# Patient Record
Sex: FEMALE | Race: WHITE | NOT HISPANIC OR LATINO | Employment: OTHER | URBAN - METROPOLITAN AREA
[De-identification: names, ages, dates, MRNs, and addresses within clinical notes are randomized per-mention and may not be internally consistent; named-entity substitution may affect disease eponyms.]

---

## 2023-01-24 PROBLEM — M54.50 CHRONIC LOW BACK PAIN: Status: ACTIVE | Noted: 2023-01-24

## 2023-01-24 PROBLEM — G89.29 CHRONIC LOW BACK PAIN: Status: ACTIVE | Noted: 2023-01-24

## 2023-01-24 PROBLEM — M62.81 PROXIMAL MUSCLE WEAKNESS: Status: ACTIVE | Noted: 2023-01-24

## 2023-01-24 PROBLEM — R53.81 CHRONIC FATIGUE AND MALAISE: Status: ACTIVE | Noted: 2023-01-24

## 2023-01-24 PROBLEM — M25.512 LEFT SHOULDER PAIN: Status: ACTIVE | Noted: 2023-01-24

## 2023-01-24 PROBLEM — R40.0 DAYTIME SLEEPINESS: Status: ACTIVE | Noted: 2023-01-24

## 2023-01-24 PROBLEM — R60.0 PERIPHERAL EDEMA: Status: ACTIVE | Noted: 2023-01-24

## 2023-01-24 PROBLEM — E78.5 DYSLIPIDEMIA: Status: ACTIVE | Noted: 2023-01-24

## 2023-01-24 PROBLEM — R51.9 CHRONIC DAILY HEADACHE: Status: ACTIVE | Noted: 2023-01-24

## 2023-01-24 PROBLEM — M79.7 FIBROMYALGIA: Status: ACTIVE | Noted: 2023-01-24

## 2023-01-24 PROBLEM — M19.90 ARTHRITIS: Status: ACTIVE | Noted: 2023-01-24

## 2023-01-24 PROBLEM — M54.12 CERVICAL RADICULITIS: Status: ACTIVE | Noted: 2023-01-24

## 2023-01-24 PROBLEM — K22.2 SCHATZKI'S RING: Status: ACTIVE | Noted: 2023-01-24

## 2023-01-24 PROBLEM — R53.82 CHRONIC FATIGUE AND MALAISE: Status: ACTIVE | Noted: 2023-01-24

## 2023-01-24 PROBLEM — R60.9 PERIPHERAL EDEMA: Status: ACTIVE | Noted: 2023-01-24

## 2023-01-24 PROBLEM — E55.9 VITAMIN D DEFICIENCY: Status: ACTIVE | Noted: 2023-01-24

## 2023-01-24 PROBLEM — M53.86 SCIATICA OF RIGHT SIDE ASSOCIATED WITH DISORDER OF LUMBAR SPINE: Status: ACTIVE | Noted: 2023-01-24

## 2023-01-24 PROBLEM — I10 HTN (HYPERTENSION): Status: ACTIVE | Noted: 2023-01-24

## 2023-01-24 PROBLEM — G25.81 RESTLESS LEG: Status: ACTIVE | Noted: 2023-01-24

## 2023-01-24 RX ORDER — ROSUVASTATIN CALCIUM 10 MG/1
10 TABLET, COATED ORAL DAILY
COMMUNITY
Start: 2022-08-18 | End: 2023-03-31 | Stop reason: SDUPTHER

## 2023-01-24 RX ORDER — LISINOPRIL 5 MG/1
5 TABLET ORAL DAILY
COMMUNITY
Start: 2021-12-29 | End: 2023-03-31 | Stop reason: SDUPTHER

## 2023-03-31 ENCOUNTER — OFFICE VISIT (OUTPATIENT)
Dept: PRIMARY CARE | Facility: CLINIC | Age: 63
End: 2023-03-31
Payer: MEDICARE

## 2023-03-31 VITALS
HEIGHT: 67 IN | HEART RATE: 70 BPM | BODY MASS INDEX: 27.2 KG/M2 | DIASTOLIC BLOOD PRESSURE: 78 MMHG | SYSTOLIC BLOOD PRESSURE: 130 MMHG | RESPIRATION RATE: 16 BRPM | WEIGHT: 173.28 LBS

## 2023-03-31 DIAGNOSIS — I20.89 ATYPICAL ANGINA (CMS-HCC): ICD-10-CM

## 2023-03-31 DIAGNOSIS — I73.9 PERIPHERAL VASCULAR DISEASE, ASYMPTOMATIC (CMS-HCC): Primary | ICD-10-CM

## 2023-03-31 DIAGNOSIS — I10 PRIMARY HYPERTENSION: ICD-10-CM

## 2023-03-31 DIAGNOSIS — E78.5 DYSLIPIDEMIA: ICD-10-CM

## 2023-03-31 DIAGNOSIS — M19.90 ARTHRITIS: ICD-10-CM

## 2023-03-31 PROCEDURE — 3078F DIAST BP <80 MM HG: CPT | Performed by: FAMILY MEDICINE

## 2023-03-31 PROCEDURE — 99214 OFFICE O/P EST MOD 30 MIN: CPT | Performed by: FAMILY MEDICINE

## 2023-03-31 PROCEDURE — 3075F SYST BP GE 130 - 139MM HG: CPT | Performed by: FAMILY MEDICINE

## 2023-03-31 RX ORDER — ROSUVASTATIN CALCIUM 10 MG/1
10 TABLET, COATED ORAL DAILY
Qty: 90 TABLET | Refills: 3 | Status: SHIPPED | OUTPATIENT
Start: 2023-03-31 | End: 2024-03-28 | Stop reason: SDUPTHER

## 2023-03-31 RX ORDER — LISINOPRIL 5 MG/1
5 TABLET ORAL DAILY
Qty: 90 TABLET | Refills: 3 | Status: SHIPPED | OUTPATIENT
Start: 2023-03-31 | End: 2024-03-28 | Stop reason: SDUPTHER

## 2023-03-31 NOTE — PATIENT INSTRUCTIONS
Please continue follow up with your bariatric team.     Mammogram in Aug 2022 was good. repeat yearly.     Blood pressure looks still a little bit elevated, goal is to keep it under 130/78.     Weight is down another 23 #,      Labs due again in November 2023    Refilled the crestor and lisinopril     monitor your BP at home.     Follow up in 6 months for a yearly Medicare Annual Wellness Examination r sooner as needed.

## 2023-03-31 NOTE — PROGRESS NOTES
Subjective   Dara Abdullahi is a 62 y.o. female who presents for 4 month follow up .    HPI  Dara is a 61 yr old female here for follow up   On SS/disability since last visit   Pt had a hysterectomy, no need for PAP    #) obesity with comorbidities - had Bariatric surgery in Aug 2022.   - BMI 36--> 31  - START WEIGHT: 255  - CURRENT WEIGHT: 235--> 199--> 173   - 8/4/22 had bariatric surgery   - tolerable discomfort   - current diet is liquid to advance to pureed today  - reduced appetite   - no nausea, vomiting  - normal BM     #) Mood is good and sleep is not good   - not comfortable   - frequent wakening     #Leg swelling - not too bad - continues to get better with weight loss  - legs always hurt , this is chronic  -comes and goes   - will get ECHO and trial lasix with Potassium     #) intermittent HA- stable   - slight improvement with topamax 100- thinks it helped some with the increased dose   - most days  - occasional neck pain, bilateral   - no emesis  - trying not to take any more NSAIDS    #) Esophageal strictures, GERD and abdominal pain  prior to surgery - stable , stable no problems with swallowing, once after eating get nauseated     #) lack of motivation without depression- stable , improved   - denies depression   - + snoring, continued to decline a sleep study- screen with bariatric surgery and was mild prior to weight loss    #) HLD  - tried on atorvastatin 20 mg QHS  - causes myalgias so stopped taking it     #) Joint and muscle pain; dx of fibromyalgia  - now back is really hurting her, does have arthritis with CT  - hurting all the time, requesting SS /disability; already on disability, applying for SS  - FCT done 11/2014  - order PT last visit   - pain mostly in the legs, arm, feet and back  - Tylenol for pain with no relief, cant take NSAIDs because of the stomach   - low back x-rays showed DJD, recommend PT   - could not tolerate gabapentin, lyrica, Effexor and cymbalta   - saw neuro  "10/18/14, EMG, skin biopsy, US for PAD, autoimmune panel all negative    #) low vitamin D  - on replacement and recheck with new blood work    #) Preventive:  Smoker: no  Etoh: no  BP: 126/78, 117/70, 132/81, 122/74  Fam h/o: mother with CAD, DM and CA; father with Etoh use   Vaccinations:  PAP: 2010, vaginal hysterectomy for uterine prolapse  Mammogram: 3/21/17 normal ; 4/23/18, 4/23/19  Colonoscopy: needs, recommended f/u with Dr. Friedman 4/28/17 appt  DEXA: n/a  HepC screen: NR on 3/9/17  Fasting blood work: 3/9/17, 4/23/18  Last eye exam: needs  Last dental Exam: needs  Retired   4 children       ROS was completed and all systems are negative with the exception of what was noted in the the HPI.     Objective     /78   Pulse 70   Resp 16   Ht 1.702 m (5' 7\")   Wt 78.6 kg (173 lb 4.5 oz)   BMI 27.14 kg/m²      Physical Exam  Gen: A+O, NAD  HEENT: NC/AT, EOMI, no LAD, oropharynx clear, no edema or erythema, TM visualized and normal  CV: RRR, No M/R/G  Resp: CTAB, No W/R/C  Abd: Soft, NT/ND  Neuro: PERRL, moves all extremities equally, normal gait   Skin: No rashes, no LE edema or varicosities   MSK: Grossly intact strength and reflexes; normal gait  Psych: Normal mood and affect    Assessment/Plan   Problem List Items Addressed This Visit          Circulatory    HTN (hypertension)    Peripheral vascular disease, asymptomatic (CMS/HCC) - Primary    Atypical angina (CMS/HCC)       Musculoskeletal    Arthritis       Other    Dyslipidemia   Please continue follow up with your bariatric team.     Mammogram in Aug 2022 was good. repeat yearly.     Blood pressure looks still a little bit elevated, goal is to keep it under 130/78.     Weight is down another 23 #,      Labs due again in November 2023    Refilled the crestor and lisinopril     monitor your BP at home.     Follow up in 6 months for a yearly Medicare Annual Wellness Examination r sooner as needed.             Dara Carpenter, DO, MSMed, ABOM  [address]   [address]   [place] [address]  Ph. [phone]  Fx. [phone]

## 2023-04-05 LAB
ALANINE AMINOTRANSFERASE (SGPT) (U/L) IN SER/PLAS: 21 U/L (ref 7–45)
ALBUMIN (G/DL) IN SER/PLAS: 3.8 G/DL (ref 3.4–5)
ALKALINE PHOSPHATASE (U/L) IN SER/PLAS: 58 U/L (ref 33–136)
ANION GAP IN SER/PLAS: 11 MMOL/L (ref 10–20)
ASPARTATE AMINOTRANSFERASE (SGOT) (U/L) IN SER/PLAS: 18 U/L (ref 9–39)
BASOPHILS (10*3/UL) IN BLOOD BY AUTOMATED COUNT: 0.03 X10E9/L (ref 0–0.1)
BASOPHILS/100 LEUKOCYTES IN BLOOD BY AUTOMATED COUNT: 0.5 % (ref 0–2)
BILIRUBIN TOTAL (MG/DL) IN SER/PLAS: 0.7 MG/DL (ref 0–1.2)
CALCIUM (MG/DL) IN SER/PLAS: 8.9 MG/DL (ref 8.6–10.3)
CARBON DIOXIDE, TOTAL (MMOL/L) IN SER/PLAS: 32 MMOL/L (ref 21–32)
CHLORIDE (MMOL/L) IN SER/PLAS: 102 MMOL/L (ref 98–107)
CREATININE (MG/DL) IN SER/PLAS: 0.63 MG/DL (ref 0.5–1.05)
EOSINOPHILS (10*3/UL) IN BLOOD BY AUTOMATED COUNT: 0.06 X10E9/L (ref 0–0.7)
EOSINOPHILS/100 LEUKOCYTES IN BLOOD BY AUTOMATED COUNT: 1 % (ref 0–6)
ERYTHROCYTE DISTRIBUTION WIDTH (RATIO) BY AUTOMATED COUNT: 13.5 % (ref 11.5–14.5)
ERYTHROCYTE MEAN CORPUSCULAR HEMOGLOBIN CONCENTRATION (G/DL) BY AUTOMATED: 33.3 G/DL (ref 32–36)
ERYTHROCYTE MEAN CORPUSCULAR VOLUME (FL) BY AUTOMATED COUNT: 95 FL (ref 80–100)
ERYTHROCYTES (10*6/UL) IN BLOOD BY AUTOMATED COUNT: 3.71 X10E12/L (ref 4–5.2)
ESTIMATED AVERAGE GLUCOSE FOR HBA1C: 103 MG/DL
FERRITIN (UG/LL) IN SER/PLAS: 141 UG/L (ref 8–150)
GFR FEMALE: >90 ML/MIN/1.73M2
GLUCOSE (MG/DL) IN SER/PLAS: 80 MG/DL (ref 74–99)
HEMATOCRIT (%) IN BLOOD BY AUTOMATED COUNT: 35.1 % (ref 36–46)
HEMOGLOBIN (G/DL) IN BLOOD: 11.7 G/DL (ref 12–16)
HEMOGLOBIN A1C/HEMOGLOBIN TOTAL IN BLOOD: 5.2 %
IMMATURE GRANULOCYTES/100 LEUKOCYTES IN BLOOD BY AUTOMATED COUNT: 0.2 % (ref 0–0.9)
LEUKOCYTES (10*3/UL) IN BLOOD BY AUTOMATED COUNT: 5.7 X10E9/L (ref 4.4–11.3)
LYMPHOCYTES (10*3/UL) IN BLOOD BY AUTOMATED COUNT: 2.02 X10E9/L (ref 1.2–4.8)
LYMPHOCYTES/100 LEUKOCYTES IN BLOOD BY AUTOMATED COUNT: 35.3 % (ref 13–44)
MONOCYTES (10*3/UL) IN BLOOD BY AUTOMATED COUNT: 0.36 X10E9/L (ref 0.1–1)
MONOCYTES/100 LEUKOCYTES IN BLOOD BY AUTOMATED COUNT: 6.3 % (ref 2–10)
NEUTROPHILS (10*3/UL) IN BLOOD BY AUTOMATED COUNT: 3.24 X10E9/L (ref 1.2–7.7)
NEUTROPHILS/100 LEUKOCYTES IN BLOOD BY AUTOMATED COUNT: 56.7 % (ref 40–80)
PLATELETS (10*3/UL) IN BLOOD AUTOMATED COUNT: 237 X10E9/L (ref 150–450)
POTASSIUM (MMOL/L) IN SER/PLAS: 2.5 MMOL/L (ref 3.5–5.3)
PROTEIN TOTAL: 7.6 G/DL (ref 6.4–8.2)
SODIUM (MMOL/L) IN SER/PLAS: 142 MMOL/L (ref 136–145)
UREA NITROGEN (MG/DL) IN SER/PLAS: 6 MG/DL (ref 6–23)

## 2023-04-06 LAB
CALCIDIOL (25 OH VITAMIN D3) (NG/ML) IN SER/PLAS: 39 NG/ML
COBALAMIN (VITAMIN B12) (PG/ML) IN SER/PLAS: 501 PG/ML (ref 211–911)
FOLATE (NG/ML) IN SER/PLAS: 22.8 NG/ML

## 2023-04-10 LAB — VITAMIN B1, WHOLE BLOOD: 121 NMOL/L (ref 70–180)

## 2023-05-26 ENCOUNTER — HOSPITAL ENCOUNTER (OUTPATIENT)
Dept: DATA CONVERSION | Facility: HOSPITAL | Age: 63
End: 2023-05-26
Attending: SURGERY | Admitting: SURGERY
Payer: COMMERCIAL

## 2023-05-26 DIAGNOSIS — K21.9 GASTRO-ESOPHAGEAL REFLUX DISEASE WITHOUT ESOPHAGITIS: ICD-10-CM

## 2023-05-26 DIAGNOSIS — Z98.84 BARIATRIC SURGERY STATUS: ICD-10-CM

## 2023-05-26 DIAGNOSIS — R11.0 NAUSEA: ICD-10-CM

## 2023-05-26 DIAGNOSIS — I10 ESSENTIAL (PRIMARY) HYPERTENSION: ICD-10-CM

## 2023-05-26 DIAGNOSIS — K44.9 DIAPHRAGMATIC HERNIA WITHOUT OBSTRUCTION OR GANGRENE: ICD-10-CM

## 2023-05-26 DIAGNOSIS — E78.5 HYPERLIPIDEMIA, UNSPECIFIED: ICD-10-CM

## 2023-05-26 DIAGNOSIS — D64.9 ANEMIA, UNSPECIFIED: ICD-10-CM

## 2023-05-26 DIAGNOSIS — M79.7 FIBROMYALGIA: ICD-10-CM

## 2023-05-26 DIAGNOSIS — G47.33 OBSTRUCTIVE SLEEP APNEA (ADULT) (PEDIATRIC): ICD-10-CM

## 2023-05-26 LAB
ANION GAP IN SER/PLAS: 16 MMOL/L (ref 10–20)
CALCIUM (MG/DL) IN SER/PLAS: 9 MG/DL (ref 8.6–10.3)
CARBON DIOXIDE, TOTAL (MMOL/L) IN SER/PLAS: 27 MMOL/L (ref 21–32)
CHLORIDE (MMOL/L) IN SER/PLAS: 104 MMOL/L (ref 98–107)
CREATININE (MG/DL) IN SER/PLAS: 0.58 MG/DL (ref 0.5–1.05)
GFR FEMALE: >90 ML/MIN/1.73M2
GLUCOSE (MG/DL) IN SER/PLAS: 77 MG/DL (ref 74–99)
MAGNESIUM (MG/DL) IN SER/PLAS: 1.97 MG/DL (ref 1.6–2.4)
POTASSIUM (MMOL/L) IN SER/PLAS: 3.2 MMOL/L (ref 3.5–5.3)
SODIUM (MMOL/L) IN SER/PLAS: 144 MMOL/L (ref 136–145)
UREA NITROGEN (MG/DL) IN SER/PLAS: 8 MG/DL (ref 6–23)

## 2023-06-15 LAB
CREATININE (MG/DL) IN SER/PLAS: 0.72 MG/DL (ref 0.5–1.05)
GFR FEMALE: >90 ML/MIN/1.73M2
UREA NITROGEN (MG/DL) IN SER/PLAS: 11 MG/DL (ref 6–23)

## 2023-09-07 VITALS — BODY MASS INDEX: 25.26 KG/M2 | HEIGHT: 67 IN | WEIGHT: 160.94 LBS

## 2023-09-26 ENCOUNTER — OFFICE VISIT (OUTPATIENT)
Dept: PRIMARY CARE | Facility: CLINIC | Age: 63
End: 2023-09-26
Payer: MEDICARE

## 2023-09-26 VITALS
SYSTOLIC BLOOD PRESSURE: 124 MMHG | HEART RATE: 54 BPM | OXYGEN SATURATION: 97 % | WEIGHT: 148 LBS | BODY MASS INDEX: 23.2 KG/M2 | DIASTOLIC BLOOD PRESSURE: 70 MMHG

## 2023-09-26 DIAGNOSIS — Z00.00 ROUTINE GENERAL MEDICAL EXAMINATION AT HEALTH CARE FACILITY: Primary | ICD-10-CM

## 2023-09-26 DIAGNOSIS — Z12.31 ENCOUNTER FOR SCREENING MAMMOGRAM FOR BREAST CANCER: ICD-10-CM

## 2023-09-26 DIAGNOSIS — E66.01 MORBID (SEVERE) OBESITY DUE TO EXCESS CALORIES (MULTI): ICD-10-CM

## 2023-09-26 DIAGNOSIS — Z23 NEED FOR VACCINATION: ICD-10-CM

## 2023-09-26 DIAGNOSIS — Z78.0 ASYMPTOMATIC MENOPAUSAL STATE: ICD-10-CM

## 2023-09-26 PROCEDURE — G0008 ADMIN INFLUENZA VIRUS VAC: HCPCS | Performed by: FAMILY MEDICINE

## 2023-09-26 PROCEDURE — 90686 IIV4 VACC NO PRSV 0.5 ML IM: CPT | Performed by: FAMILY MEDICINE

## 2023-09-26 PROCEDURE — 3078F DIAST BP <80 MM HG: CPT | Performed by: FAMILY MEDICINE

## 2023-09-26 PROCEDURE — 1036F TOBACCO NON-USER: CPT | Performed by: FAMILY MEDICINE

## 2023-09-26 PROCEDURE — G0439 PPPS, SUBSEQ VISIT: HCPCS | Performed by: FAMILY MEDICINE

## 2023-09-26 PROCEDURE — 3074F SYST BP LT 130 MM HG: CPT | Performed by: FAMILY MEDICINE

## 2023-09-26 ASSESSMENT — PATIENT HEALTH QUESTIONNAIRE - PHQ9
SUM OF ALL RESPONSES TO PHQ9 QUESTIONS 1 AND 2: 0
1. LITTLE INTEREST OR PLEASURE IN DOING THINGS: NOT AT ALL
2. FEELING DOWN, DEPRESSED OR HOPELESS: NOT AT ALL

## 2023-09-26 NOTE — PATIENT INSTRUCTIONS
EGD in May looked good, follow up with Dr Mitchell as recommended for the hiatal hernia repair on 10/20/23.     BP looks good today at 124/70.     Mammogram in Aug 2022 was good. repeat yearly. Due now, order is in     Labs due again in November 2023 we will get you the order after you surgery to reduce duplicating tests.     Call if you need any medication refills today     [facility] also schedule an updated bone density screen and mammogram.     We did find the colonoscopy it was down in 2017. Repeat in 2027.     Flu shot today     Follow up in 6 months for follow up or sooner as needed.

## 2023-09-26 NOTE — PROGRESS NOTES
Subjective   Reason for Visit: Dara Abdullahi is an 62 y.o. female here for a Medicare Wellness visit.      Reviewed all medications by prescribing practitioner or clinical pharmacist (such as prescriptions, OTCs, herbal therapies and supplements) and documented in the medical record.    On SS/disability since last visit   Pt had a hysterectomy, no need for PAP    HPI    #) obesity with comorbidities - had Bariatric surgery in Aug 2022.   - BMI 36--> 31--> 32  - START WEIGHT: 255  - CURRENT WEIGHT: 235--> 199--> 173 --> 148   - 8/4/22 had bariatric surgery   - tolerable discomfort   - current diet is liquid to advance to pureed today  - reduced appetite   - no nausea, vomiting  - normal BM     #) Mood is good and sleep is not good   - not comfortable   - frequent wakening     # Leg swelling - not too bad - continues to get better with weight loss  - legs always hurt , this is chronic  -comes and goes     #) intermittent HA- stable , always has one   - slight improvement with topamax 100- thinks it helped some with the increased dose   - most days  - occasional neck pain, bilateral   - no emesis  - trying not to take any more NSAIDS    #) Esophageal strictures, GERD and abdominal pain  prior to surgery - stable , stable no problems with swallowing, once after eating get nauseated   - to get an repeat hiatal hernia repeat in October 2023.     #) lack of motivation without depression- stable , improved   - denies depression   - + snoring, continued to decline a sleep study- screen with bariatric surgery and was mild prior to weight loss    #) HLD  - tried on atorvastatin 20 mg QHS  - causes myalgias so stopped taking it     #) Joint and muscle pain; dx of fibromyalgia  - stable     #) low vitamin D  - on replacement and recheck with new blood work    #) Preventive:  Smoker: no  Etoh: no  Fam h/o: mother with CAD, DM and CA; father with Etoh use   Vaccinations:  PAP: 2010, vaginal hysterectomy for uterine  prolapse  Mammogram: 3/21/17 normal ; 4/23/18, 4/23/19-- ordered   Colonoscopy: needs, recommended f/u with Dr. Friedman 4/28/17 appt  DEXA: n/a- ordered   HepC screen: NR on 3/9/17  Fasting blood work: 3/9/17, 4/23/18  Last eye exam: needs  Last dental Exam: needs  Retired   4 children     Patient Care Team:  Dara Carpenter DO as PCP - General  Dara Carpenter DO as PCP - [facility] Attributed Provider     Review of Systems  ROS was completed and all systems are negative with the exception of what was noted in the the HPI.     Objective   Vitals:  /70   Pulse 54   Wt 67.1 kg (148 lb)   SpO2 97%   BMI 23.20 kg/m²       Physical Exam  GEN: A+O, no acute distress  HEENT: NC/AT, Oropharynx clear, no exudates, TM visualized, Extraoccular muscles intact, no facial droop; no thyromegaly or cervical LAD  RESP: CTAB, no wheezes   CV: RRR, no murmurs  ABD: soft, non-tender, + BS  SKIN: no rashes or bruising, no peripheral edema   NEURO: CN II-XII grossly intact, moves all extremities equally, no tremor   PSYCH: normal affect, appropriate mood     Assessment/Plan     EGD in May looked good, follow up with Dr Mitchell as recommended for the hiatal hernia repair on 10/20/23.     BP looks good today at 124/70.     Mammogram in Aug 2022 was good. repeat yearly. Due now, order is in     Labs due again in November 2023 we will get you the order after you surgery to reduce duplicating tests.     Call if you need any medication refills today     [facility] also schedule an updated bone density screen and mammogram.     We did find the colonoscopy it was down in 2017. Repeat in 2027.     Flu shot today     Follow up in 6 months for follow up or sooner as needed.

## 2023-09-28 LAB
ALANINE AMINOTRANSFERASE (SGPT) (U/L) IN SER/PLAS: 50 U/L (ref 7–45)
ALBUMIN (G/DL) IN SER/PLAS: 4.1 G/DL (ref 3.4–5)
ALKALINE PHOSPHATASE (U/L) IN SER/PLAS: 74 U/L (ref 33–136)
ANION GAP IN SER/PLAS: 10 MMOL/L (ref 10–20)
ASPARTATE AMINOTRANSFERASE (SGOT) (U/L) IN SER/PLAS: 39 U/L (ref 9–39)
BASOPHILS (10*3/UL) IN BLOOD BY AUTOMATED COUNT: 0.03 X10E9/L (ref 0–0.1)
BASOPHILS/100 LEUKOCYTES IN BLOOD BY AUTOMATED COUNT: 0.8 % (ref 0–2)
BILIRUBIN TOTAL (MG/DL) IN SER/PLAS: 0.5 MG/DL (ref 0–1.2)
CALCIUM (MG/DL) IN SER/PLAS: 9.1 MG/DL (ref 8.6–10.3)
CARBON DIOXIDE, TOTAL (MMOL/L) IN SER/PLAS: 31 MMOL/L (ref 21–32)
CHLORIDE (MMOL/L) IN SER/PLAS: 105 MMOL/L (ref 98–107)
CREATININE (MG/DL) IN SER/PLAS: 0.69 MG/DL (ref 0.5–1.05)
EOSINOPHILS (10*3/UL) IN BLOOD BY AUTOMATED COUNT: 0.06 X10E9/L (ref 0–0.7)
EOSINOPHILS/100 LEUKOCYTES IN BLOOD BY AUTOMATED COUNT: 1.5 % (ref 0–6)
ERYTHROCYTE DISTRIBUTION WIDTH (RATIO) BY AUTOMATED COUNT: 14.1 % (ref 11.5–14.5)
ERYTHROCYTE MEAN CORPUSCULAR HEMOGLOBIN CONCENTRATION (G/DL) BY AUTOMATED: 32.4 G/DL (ref 32–36)
ERYTHROCYTE MEAN CORPUSCULAR VOLUME (FL) BY AUTOMATED COUNT: 97 FL (ref 80–100)
ERYTHROCYTES (10*6/UL) IN BLOOD BY AUTOMATED COUNT: 3.86 X10E12/L (ref 4–5.2)
GFR FEMALE: >90 ML/MIN/1.73M2
GLUCOSE (MG/DL) IN SER/PLAS: 86 MG/DL (ref 74–99)
HEMATOCRIT (%) IN BLOOD BY AUTOMATED COUNT: 37.4 % (ref 36–46)
HEMOGLOBIN (G/DL) IN BLOOD: 12.1 G/DL (ref 12–16)
IMMATURE GRANULOCYTES/100 LEUKOCYTES IN BLOOD BY AUTOMATED COUNT: 0.3 % (ref 0–0.9)
INR IN PPP BY COAGULATION ASSAY: 1 (ref 0.9–1.1)
LEUKOCYTES (10*3/UL) IN BLOOD BY AUTOMATED COUNT: 4 X10E9/L (ref 4.4–11.3)
LYMPHOCYTES (10*3/UL) IN BLOOD BY AUTOMATED COUNT: 1.16 X10E9/L (ref 1.2–4.8)
LYMPHOCYTES/100 LEUKOCYTES IN BLOOD BY AUTOMATED COUNT: 29 % (ref 13–44)
MONOCYTES (10*3/UL) IN BLOOD BY AUTOMATED COUNT: 0.27 X10E9/L (ref 0.1–1)
MONOCYTES/100 LEUKOCYTES IN BLOOD BY AUTOMATED COUNT: 6.8 % (ref 2–10)
NEUTROPHILS (10*3/UL) IN BLOOD BY AUTOMATED COUNT: 2.47 X10E9/L (ref 1.2–7.7)
NEUTROPHILS/100 LEUKOCYTES IN BLOOD BY AUTOMATED COUNT: 61.6 % (ref 40–80)
PLATELETS (10*3/UL) IN BLOOD AUTOMATED COUNT: 179 X10E9/L (ref 150–450)
POTASSIUM (MMOL/L) IN SER/PLAS: 3.3 MMOL/L (ref 3.5–5.3)
PROTEIN TOTAL: 8.1 G/DL (ref 6.4–8.2)
PROTHROMBIN TIME (PT) IN PPP BY COAGULATION ASSAY: 11.8 SEC (ref 9.8–12.8)
SODIUM (MMOL/L) IN SER/PLAS: 143 MMOL/L (ref 136–145)
UREA NITROGEN (MG/DL) IN SER/PLAS: 9 MG/DL (ref 6–23)

## 2023-09-30 NOTE — H&P
History of Present Illness:   History Present Illness:  Reason for surgery: GERD   HPI:    59 yo female with a bmi of 39 and comorbidities of gerd and high cholesterol 3 months post bypass . She is doing great and  tolerating diet.      Review of Systems:  Constitutional: No subjective fever, No lethargy  Eyes: No vision change  Head/Neck: No head pain, no neck pain  Respiratory/Thorax: No difficulty breathing, no chest pain  Cardiovascular: No chest pain, no palpitations  Gastrointestinal: No nausea, no vomiting, no abdominal pain, no diarrhea  Genitourinary: No difficulty voiding  Musculoskeletal: No muscle pain, no joint pain  Neurological: No sensory deficit, no paresthesia   Psychological: No mood change  Skin: No rash, No itching     Allergies:        Allergies:  ·  No Known Allergies :     Home Medication Review:   Home Medications Reviewed: yes     Impression/Procedure:   ·  Impression and Planned Procedure: Patient presents for EGD. All risks and benefits were explained in detail. Informed consent was obtained, and patient agrees to proceed. Preoperative imaging and work  up was reviewed.  -will proceed with planned procedure       ERAS (Enhanced Recovery After Surgery):  ·  ERAS Patient: yes   ·  CPM/PAT Utilization: yes   ·  Immunonutrition Recovery Drink Utilization: yes   ·  Carbohydrate Supplement Drink Utilization: yes       Physical Exam by System:    Constitutional: Well developed, awake/alert/oriented  x3, no distress, alert and cooperative   Eyes: PERRL, EOMI, clear sclera   ENMT: mucous membranes moist, no apparent injury,  no lesions seen   Head/Neck: Neck supple, no apparent injury, thyroid  without mass or tenderness, No JVD, trachea midline, no bruits   Respiratory/Thorax: Patent airways, CTAB, normal  breath sounds with good chest expansion, thorax symmetric   Cardiovascular: Regular, rate and rhythm, no murmurs,  2+ equal pulses of the extremities, normal S 1and S 2   Gastrointestinal:  Nondistended, soft, non-tender,  no rebound tenderness or guarding, no masses palpable, no organomegaly, +BS, no bruits   Neurological: alert and oriented x3, intact senses,  motor, response and reflexes, normal strength   Skin: Warm and dry, no lesions, no rashes     Consent:   COVID-19 Consent:  ·  COVID-19 Risk Consent Surgeon has reviewed key risks related to the risk of britton COVID-19 and if they contract COVID-19 what the risks are.     Attestation:   Note Completion:  I am a:  Resident/Fellow   Attending Attestation I saw and evaluated the patient.  I personally obtained the key and critical portions of the history and physical exam or was physically present for key and  critical portions performed by the resident/fellow. I reviewed the resident/fellow?s documentation and discussed the patient with the resident/fellow.  I agree with the resident/fellow?s medical decision making as documented in the note.   I personally evaluated the patient on 20-May-2023         Electronic Signatures:  Iman Mitchell)  (Signed 08-Jun-2023 10:40)   Authored: Note Completion   Co-Signer: History of Present Illness, Allergies, Home Medication Review, Impression/Procedure, ERAS, Physical Exam, Consent, Note Completion  Clifford Rodney)  (Signed 20-May-2023 23:24)   Authored: History of Present Illness, Allergies, Home  Medication Review, Impression/Procedure, ERAS, Physical Exam, Consent, Note Completion      Last Updated: 08-Jun-2023 10:40 by Iman Mitchell)

## 2023-10-03 LAB
ABO GROUP (TYPE) IN BLOOD: NORMAL
ANTIBODY SCREEN: NORMAL
RH FACTOR: NORMAL

## 2023-10-10 ENCOUNTER — PREP FOR PROCEDURE (OUTPATIENT)
Dept: BARIATRICS | Facility: HOSPITAL | Age: 63
End: 2023-10-10
Payer: COMMERCIAL

## 2023-10-10 DIAGNOSIS — K44.9 HIATAL HERNIA: Primary | ICD-10-CM

## 2023-10-19 ENCOUNTER — HOSPITAL ENCOUNTER (OUTPATIENT)
Dept: RADIOLOGY | Facility: HOSPITAL | Age: 63
Discharge: HOME | End: 2023-10-19
Payer: MEDICARE

## 2023-10-19 ENCOUNTER — PHARMACY VISIT (OUTPATIENT)
Dept: PHARMACY | Facility: CLINIC | Age: 63
End: 2023-10-19
Payer: COMMERCIAL

## 2023-10-19 ENCOUNTER — ANESTHESIA EVENT (OUTPATIENT)
Dept: OPERATING ROOM | Facility: HOSPITAL | Age: 63
End: 2023-10-19
Payer: MEDICARE

## 2023-10-19 DIAGNOSIS — Z12.31 ENCOUNTER FOR SCREENING MAMMOGRAM FOR BREAST CANCER: ICD-10-CM

## 2023-10-19 PROCEDURE — 77067 SCR MAMMO BI INCL CAD: CPT

## 2023-10-19 PROCEDURE — 77063 BREAST TOMOSYNTHESIS BI: CPT | Mod: BILATERAL PROCEDURE | Performed by: RADIOLOGY

## 2023-10-19 PROCEDURE — RXMED WILLOW AMBULATORY MEDICATION CHARGE

## 2023-10-19 PROCEDURE — 77067 SCR MAMMO BI INCL CAD: CPT | Mod: BILATERAL PROCEDURE | Performed by: RADIOLOGY

## 2023-10-19 PROCEDURE — 77063 BREAST TOMOSYNTHESIS BI: CPT | Mod: 50

## 2023-10-19 RX ORDER — OXYCODONE HCL 5 MG/5 ML
5 SOLUTION, ORAL ORAL EVERY 6 HOURS PRN
Qty: 140 ML | Refills: 0 | Status: SHIPPED | OUTPATIENT
Start: 2023-10-19 | End: 2024-03-28 | Stop reason: WASHOUT

## 2023-10-19 RX ORDER — ONDANSETRON 4 MG/1
4 TABLET, ORALLY DISINTEGRATING ORAL EVERY 8 HOURS PRN
Qty: 30 TABLET | Refills: 0 | Status: SHIPPED | OUTPATIENT
Start: 2023-10-19 | End: 2023-11-18

## 2023-10-19 RX ORDER — OMEPRAZOLE 40 MG/1
40 CAPSULE, DELAYED RELEASE ORAL
Qty: 30 CAPSULE | Refills: 3 | Status: SHIPPED | OUTPATIENT
Start: 2023-10-19 | End: 2024-02-16

## 2023-10-20 ENCOUNTER — ANESTHESIA (OUTPATIENT)
Dept: OPERATING ROOM | Facility: HOSPITAL | Age: 63
End: 2023-10-20
Payer: MEDICARE

## 2023-10-20 ENCOUNTER — HOSPITAL ENCOUNTER (OUTPATIENT)
Facility: HOSPITAL | Age: 63
Discharge: HOME | End: 2023-10-21
Attending: SURGERY | Admitting: SURGERY
Payer: MEDICARE

## 2023-10-20 DIAGNOSIS — K44.9 HIATAL HERNIA: Primary | ICD-10-CM

## 2023-10-20 PROBLEM — R13.10 DYSPHAGIA: Status: ACTIVE | Noted: 2023-10-20

## 2023-10-20 PROCEDURE — C1768 GRAFT, VASCULAR: HCPCS | Performed by: SURGERY

## 2023-10-20 PROCEDURE — 96372 THER/PROPH/DIAG INJ SC/IM: CPT | Performed by: STUDENT IN AN ORGANIZED HEALTH CARE EDUCATION/TRAINING PROGRAM

## 2023-10-20 PROCEDURE — 2500000005 HC RX 250 GENERAL PHARMACY W/O HCPCS: Performed by: SURGERY

## 2023-10-20 PROCEDURE — 3700000002 HC GENERAL ANESTHESIA TIME - EACH INCREMENTAL 1 MINUTE: Performed by: SURGERY

## 2023-10-20 PROCEDURE — 2500000005 HC RX 250 GENERAL PHARMACY W/O HCPCS: Performed by: REGISTERED NURSE

## 2023-10-20 PROCEDURE — 3600000004 HC OR TIME - INITIAL BASE CHARGE - PROCEDURE LEVEL FOUR: Performed by: SURGERY

## 2023-10-20 PROCEDURE — 7100000002 HC RECOVERY ROOM TIME - EACH INCREMENTAL 1 MINUTE: Performed by: SURGERY

## 2023-10-20 PROCEDURE — 2500000004 HC RX 250 GENERAL PHARMACY W/ HCPCS (ALT 636 FOR OP/ED): Performed by: ANESTHESIOLOGY

## 2023-10-20 PROCEDURE — 3600000009 HC OR TIME - EACH INCREMENTAL 1 MINUTE - PROCEDURE LEVEL FOUR: Performed by: SURGERY

## 2023-10-20 PROCEDURE — G0378 HOSPITAL OBSERVATION PER HR: HCPCS

## 2023-10-20 PROCEDURE — A43281 PR LAP, REPAIR PARAESOPHAGEAL HERNIA, INCL FUNDOPLASTY W/O MESH: Performed by: REGISTERED NURSE

## 2023-10-20 PROCEDURE — C1781 MESH (IMPLANTABLE): HCPCS | Performed by: SURGERY

## 2023-10-20 PROCEDURE — 2720000007 HC OR 272 NO HCPCS: Performed by: SURGERY

## 2023-10-20 PROCEDURE — 2500000001 HC RX 250 WO HCPCS SELF ADMINISTERED DRUGS (ALT 637 FOR MEDICARE OP): Performed by: STUDENT IN AN ORGANIZED HEALTH CARE EDUCATION/TRAINING PROGRAM

## 2023-10-20 PROCEDURE — 51702 INSERT TEMP BLADDER CATH: CPT

## 2023-10-20 PROCEDURE — 43235 EGD DIAGNOSTIC BRUSH WASH: CPT | Performed by: SURGERY

## 2023-10-20 PROCEDURE — 2780000003 HC OR 278 NO HCPCS: Performed by: SURGERY

## 2023-10-20 PROCEDURE — 2500000004 HC RX 250 GENERAL PHARMACY W/ HCPCS (ALT 636 FOR OP/ED): Performed by: REGISTERED NURSE

## 2023-10-20 PROCEDURE — 43282 LAP PARAESOPH HER RPR W/MESH: CPT | Performed by: SURGERY

## 2023-10-20 PROCEDURE — 7100000001 HC RECOVERY ROOM TIME - INITIAL BASE CHARGE: Performed by: SURGERY

## 2023-10-20 PROCEDURE — 2500000004 HC RX 250 GENERAL PHARMACY W/ HCPCS (ALT 636 FOR OP/ED): Performed by: STUDENT IN AN ORGANIZED HEALTH CARE EDUCATION/TRAINING PROGRAM

## 2023-10-20 PROCEDURE — 3700000001 HC GENERAL ANESTHESIA TIME - INITIAL BASE CHARGE: Performed by: SURGERY

## 2023-10-20 DEVICE — SHEET, FLAT, GORE BIO-A TISSUE REINFORCE, 7 X 10 CM: Type: IMPLANTABLE DEVICE | Site: ABDOMEN | Status: FUNCTIONAL

## 2023-10-20 RX ORDER — ROCURONIUM BROMIDE 10 MG/ML
INJECTION, SOLUTION INTRAVENOUS AS NEEDED
Status: DISCONTINUED | OUTPATIENT
Start: 2023-10-20 | End: 2023-10-20

## 2023-10-20 RX ORDER — OXYCODONE HCL 5 MG/5 ML
10 SOLUTION, ORAL ORAL EVERY 4 HOURS PRN
Status: DISCONTINUED | OUTPATIENT
Start: 2023-10-20 | End: 2023-10-21 | Stop reason: HOSPADM

## 2023-10-20 RX ORDER — OXYCODONE HCL 5 MG/5 ML
5 SOLUTION, ORAL ORAL EVERY 4 HOURS PRN
Status: DISCONTINUED | OUTPATIENT
Start: 2023-10-20 | End: 2023-10-21 | Stop reason: HOSPADM

## 2023-10-20 RX ORDER — METOCLOPRAMIDE HYDROCHLORIDE 5 MG/ML
10 INJECTION INTRAMUSCULAR; INTRAVENOUS EVERY 6 HOURS PRN
Status: DISCONTINUED | OUTPATIENT
Start: 2023-10-20 | End: 2023-10-21 | Stop reason: HOSPADM

## 2023-10-20 RX ORDER — MIDAZOLAM HYDROCHLORIDE 1 MG/ML
INJECTION, SOLUTION INTRAMUSCULAR; INTRAVENOUS AS NEEDED
Status: DISCONTINUED | OUTPATIENT
Start: 2023-10-20 | End: 2023-10-20

## 2023-10-20 RX ORDER — FENTANYL CITRATE 50 UG/ML
INJECTION, SOLUTION INTRAMUSCULAR; INTRAVENOUS AS NEEDED
Status: DISCONTINUED | OUTPATIENT
Start: 2023-10-20 | End: 2023-10-20

## 2023-10-20 RX ORDER — HEPARIN SODIUM 5000 [USP'U]/ML
5000 INJECTION, SOLUTION INTRAVENOUS; SUBCUTANEOUS EVERY 8 HOURS
Status: DISCONTINUED | OUTPATIENT
Start: 2023-10-20 | End: 2023-10-21 | Stop reason: HOSPADM

## 2023-10-20 RX ORDER — LIDOCAINE HCL/PF 100 MG/5ML
SYRINGE (ML) INTRAVENOUS AS NEEDED
Status: DISCONTINUED | OUTPATIENT
Start: 2023-10-20 | End: 2023-10-20

## 2023-10-20 RX ORDER — PROPOFOL 10 MG/ML
INJECTION, EMULSION INTRAVENOUS AS NEEDED
Status: DISCONTINUED | OUTPATIENT
Start: 2023-10-20 | End: 2023-10-20

## 2023-10-20 RX ORDER — ACETAMINOPHEN 325 MG/1
650 TABLET ORAL EVERY 4 HOURS PRN
Status: DISCONTINUED | OUTPATIENT
Start: 2023-10-20 | End: 2023-10-20 | Stop reason: HOSPADM

## 2023-10-20 RX ORDER — ONDANSETRON HYDROCHLORIDE 2 MG/ML
8 INJECTION, SOLUTION INTRAVENOUS ONCE
Status: DISCONTINUED | OUTPATIENT
Start: 2023-10-20 | End: 2023-10-20 | Stop reason: HOSPADM

## 2023-10-20 RX ORDER — SODIUM CHLORIDE, SODIUM LACTATE, POTASSIUM CHLORIDE, CALCIUM CHLORIDE 600; 310; 30; 20 MG/100ML; MG/100ML; MG/100ML; MG/100ML
100 INJECTION, SOLUTION INTRAVENOUS CONTINUOUS
Status: DISCONTINUED | OUTPATIENT
Start: 2023-10-20 | End: 2023-10-21 | Stop reason: HOSPADM

## 2023-10-20 RX ORDER — BUPIVACAINE HCL/EPINEPHRINE 0.5-1:200K
VIAL (ML) INJECTION AS NEEDED
Status: DISCONTINUED | OUTPATIENT
Start: 2023-10-20 | End: 2023-10-20 | Stop reason: HOSPADM

## 2023-10-20 RX ORDER — BISMUTH SUBSALICYLATE 262 MG
1 TABLET,CHEWABLE ORAL DAILY
COMMUNITY

## 2023-10-20 RX ORDER — PHENYLEPHRINE HCL IN 0.9% NACL 0.4MG/10ML
SYRINGE (ML) INTRAVENOUS AS NEEDED
Status: DISCONTINUED | OUTPATIENT
Start: 2023-10-20 | End: 2023-10-20

## 2023-10-20 RX ORDER — ALBUTEROL SULFATE 0.83 MG/ML
2.5 SOLUTION RESPIRATORY (INHALATION) ONCE AS NEEDED
Status: DISCONTINUED | OUTPATIENT
Start: 2023-10-20 | End: 2023-10-20 | Stop reason: HOSPADM

## 2023-10-20 RX ORDER — SODIUM CHLORIDE 0.9 % (FLUSH) 0.9 %
SYRINGE (ML) INJECTION AS NEEDED
Status: DISCONTINUED | OUTPATIENT
Start: 2023-10-20 | End: 2023-10-20 | Stop reason: HOSPADM

## 2023-10-20 RX ORDER — KETOROLAC TROMETHAMINE 15 MG/ML
15 INJECTION, SOLUTION INTRAMUSCULAR; INTRAVENOUS EVERY 6 HOURS
Status: DISCONTINUED | OUTPATIENT
Start: 2023-10-20 | End: 2023-10-21 | Stop reason: HOSPADM

## 2023-10-20 RX ORDER — ONDANSETRON HYDROCHLORIDE 2 MG/ML
INJECTION, SOLUTION INTRAVENOUS AS NEEDED
Status: DISCONTINUED | OUTPATIENT
Start: 2023-10-20 | End: 2023-10-20

## 2023-10-20 RX ORDER — ONDANSETRON HYDROCHLORIDE 2 MG/ML
4 INJECTION, SOLUTION INTRAVENOUS EVERY 8 HOURS PRN
Status: DISCONTINUED | OUTPATIENT
Start: 2023-10-20 | End: 2023-10-21 | Stop reason: HOSPADM

## 2023-10-20 RX ORDER — KETOROLAC TROMETHAMINE 30 MG/ML
INJECTION, SOLUTION INTRAMUSCULAR; INTRAVENOUS AS NEEDED
Status: DISCONTINUED | OUTPATIENT
Start: 2023-10-20 | End: 2023-10-20

## 2023-10-20 RX ORDER — SCOLOPAMINE TRANSDERMAL SYSTEM 1 MG/1
1 PATCH, EXTENDED RELEASE TRANSDERMAL
Status: DISCONTINUED | OUTPATIENT
Start: 2023-10-20 | End: 2023-10-21 | Stop reason: HOSPADM

## 2023-10-20 RX ADMIN — ROCURONIUM BROMIDE 20 MG: 10 INJECTION, SOLUTION INTRAVENOUS at 10:26

## 2023-10-20 RX ADMIN — HYDROMORPHONE HYDROCHLORIDE 0.5 MG: 1 INJECTION, SOLUTION INTRAMUSCULAR; INTRAVENOUS; SUBCUTANEOUS at 12:57

## 2023-10-20 RX ADMIN — PROPOFOL 200 MG: 10 INJECTION, EMULSION INTRAVENOUS at 09:42

## 2023-10-20 RX ADMIN — OXYCODONE HYDROCHLORIDE 10 MG: 5 SOLUTION ORAL at 21:59

## 2023-10-20 RX ADMIN — KETOROLAC TROMETHAMINE 30 MG: 30 INJECTION, SOLUTION INTRAMUSCULAR at 11:31

## 2023-10-20 RX ADMIN — FENTANYL CITRATE 100 MCG: 50 INJECTION, SOLUTION INTRAMUSCULAR; INTRAVENOUS at 09:42

## 2023-10-20 RX ADMIN — HEPARIN SODIUM 5000 UNITS: 5000 INJECTION INTRAVENOUS; SUBCUTANEOUS at 21:59

## 2023-10-20 RX ADMIN — SUGAMMADEX 200 MG: 100 INJECTION, SOLUTION INTRAVENOUS at 11:38

## 2023-10-20 RX ADMIN — Medication 120 MCG: at 10:00

## 2023-10-20 RX ADMIN — HYDROMORPHONE HYDROCHLORIDE 0.5 MG: 1 INJECTION, SOLUTION INTRAMUSCULAR; INTRAVENOUS; SUBCUTANEOUS at 12:29

## 2023-10-20 RX ADMIN — KETOROLAC TROMETHAMINE 15 MG: 15 INJECTION, SOLUTION INTRAMUSCULAR; INTRAVENOUS at 17:52

## 2023-10-20 RX ADMIN — SODIUM CHLORIDE, POTASSIUM CHLORIDE, SODIUM LACTATE AND CALCIUM CHLORIDE 100 ML/HR: 600; 310; 30; 20 INJECTION, SOLUTION INTRAVENOUS at 21:05

## 2023-10-20 RX ADMIN — MIDAZOLAM 2 MG: 1 INJECTION INTRAMUSCULAR; INTRAVENOUS at 09:42

## 2023-10-20 RX ADMIN — KETOROLAC TROMETHAMINE 15 MG: 15 INJECTION, SOLUTION INTRAMUSCULAR; INTRAVENOUS at 23:37

## 2023-10-20 RX ADMIN — LIDOCAINE HYDROCHLORIDE 100 MG: 20 INJECTION INTRAVENOUS at 09:42

## 2023-10-20 RX ADMIN — SCOPOLAMINE 1 PATCH: 1 SYSTEM TRANSDERMAL at 14:36

## 2023-10-20 RX ADMIN — HEPARIN SODIUM 5000 UNITS: 5000 INJECTION INTRAVENOUS; SUBCUTANEOUS at 14:37

## 2023-10-20 RX ADMIN — SODIUM CHLORIDE, SODIUM LACTATE, POTASSIUM CHLORIDE, AND CALCIUM CHLORIDE: 600; 310; 30; 20 INJECTION, SOLUTION INTRAVENOUS at 09:42

## 2023-10-20 RX ADMIN — SODIUM CHLORIDE, POTASSIUM CHLORIDE, SODIUM LACTATE AND CALCIUM CHLORIDE 100 ML/HR: 600; 310; 30; 20 INJECTION, SOLUTION INTRAVENOUS at 07:30

## 2023-10-20 RX ADMIN — Medication 120 MCG: at 10:26

## 2023-10-20 RX ADMIN — ONDANSETRON 4 MG: 2 INJECTION, SOLUTION INTRAMUSCULAR; INTRAVENOUS at 11:31

## 2023-10-20 RX ADMIN — ROCURONIUM BROMIDE 50 MG: 10 INJECTION, SOLUTION INTRAVENOUS at 09:42

## 2023-10-20 RX ADMIN — Medication 2 G: at 09:41

## 2023-10-20 RX ADMIN — DEXAMETHASONE SODIUM PHOSPHATE 4 MG: 4 INJECTION INTRA-ARTICULAR; INTRALESIONAL; INTRAMUSCULAR; INTRAVENOUS; SOFT TISSUE at 11:31

## 2023-10-20 SDOH — SOCIAL STABILITY: SOCIAL INSECURITY: ARE THERE ANY APPARENT SIGNS OF INJURIES/BEHAVIORS THAT COULD BE RELATED TO ABUSE/NEGLECT?: NO

## 2023-10-20 SDOH — SOCIAL STABILITY: SOCIAL INSECURITY: DO YOU FEEL ANYONE HAS EXPLOITED OR TAKEN ADVANTAGE OF YOU FINANCIALLY OR OF YOUR PERSONAL PROPERTY?: NO

## 2023-10-20 SDOH — SOCIAL STABILITY: SOCIAL INSECURITY: DO YOU FEEL UNSAFE GOING BACK TO THE PLACE WHERE YOU ARE LIVING?: NO

## 2023-10-20 SDOH — SOCIAL STABILITY: SOCIAL INSECURITY: ABUSE: ADULT

## 2023-10-20 SDOH — SOCIAL STABILITY: SOCIAL INSECURITY: HAS ANYONE EVER THREATENED TO HURT YOUR FAMILY OR YOUR PETS?: NO

## 2023-10-20 SDOH — SOCIAL STABILITY: SOCIAL INSECURITY: ARE YOU OR HAVE YOU BEEN THREATENED OR ABUSED PHYSICALLY, EMOTIONALLY, OR SEXUALLY BY ANYONE?: NO

## 2023-10-20 SDOH — SOCIAL STABILITY: SOCIAL INSECURITY: HAVE YOU HAD THOUGHTS OF HARMING ANYONE ELSE?: NO

## 2023-10-20 SDOH — SOCIAL STABILITY: SOCIAL INSECURITY: DOES ANYONE TRY TO KEEP YOU FROM HAVING/CONTACTING OTHER FRIENDS OR DOING THINGS OUTSIDE YOUR HOME?: NO

## 2023-10-20 SDOH — HEALTH STABILITY: MENTAL HEALTH: CURRENT SMOKER: 0

## 2023-10-20 SDOH — SOCIAL STABILITY: SOCIAL INSECURITY: WERE YOU ABLE TO COMPLETE ALL THE BEHAVIORAL HEALTH SCREENINGS?: YES

## 2023-10-20 ASSESSMENT — PAIN - FUNCTIONAL ASSESSMENT
PAIN_FUNCTIONAL_ASSESSMENT: 0-10

## 2023-10-20 ASSESSMENT — ENCOUNTER SYMPTOMS
CHEST TIGHTNESS: 0
CHILLS: 0
VOMITING: 0
FACIAL SWELLING: 0
FLANK PAIN: 0
TREMORS: 0
DIFFICULTY URINATING: 0
POLYPHAGIA: 0
SINUS PAIN: 0
RHINORRHEA: 0
APNEA: 0
PALPITATIONS: 0
NUMBNESS: 0
FEVER: 0
POLYDIPSIA: 0
COLOR CHANGE: 0
AGITATION: 0
NAUSEA: 0
CONFUSION: 0
HALLUCINATIONS: 0
LIGHT-HEADEDNESS: 0
CHOKING: 0

## 2023-10-20 ASSESSMENT — COGNITIVE AND FUNCTIONAL STATUS - GENERAL
CLIMB 3 TO 5 STEPS WITH RAILING: A LITTLE
CLIMB 3 TO 5 STEPS WITH RAILING: A LITTLE
HELP NEEDED FOR BATHING: A LITTLE
DAILY ACTIVITIY SCORE: 21
WALKING IN HOSPITAL ROOM: A LITTLE
PATIENT BASELINE BEDBOUND: NO
DAILY ACTIVITIY SCORE: 21
HELP NEEDED FOR BATHING: A LITTLE
DRESSING REGULAR UPPER BODY CLOTHING: A LITTLE
MOVING TO AND FROM BED TO CHAIR: A LITTLE
WALKING IN HOSPITAL ROOM: A LITTLE
TURNING FROM BACK TO SIDE WHILE IN FLAT BAD: A LITTLE
STANDING UP FROM CHAIR USING ARMS: A LITTLE
STANDING UP FROM CHAIR USING ARMS: A LITTLE
MOBILITY SCORE: 18
DRESSING REGULAR LOWER BODY CLOTHING: A LITTLE
MOBILITY SCORE: 18
DRESSING REGULAR LOWER BODY CLOTHING: A LITTLE
MOVING FROM LYING ON BACK TO SITTING ON SIDE OF FLAT BED WITH BEDRAILS: A LITTLE
MOVING FROM LYING ON BACK TO SITTING ON SIDE OF FLAT BED WITH BEDRAILS: A LITTLE
MOVING TO AND FROM BED TO CHAIR: A LITTLE
DRESSING REGULAR UPPER BODY CLOTHING: A LITTLE
TURNING FROM BACK TO SIDE WHILE IN FLAT BAD: A LITTLE

## 2023-10-20 ASSESSMENT — COLUMBIA-SUICIDE SEVERITY RATING SCALE - C-SSRS
6. HAVE YOU EVER DONE ANYTHING, STARTED TO DO ANYTHING, OR PREPARED TO DO ANYTHING TO END YOUR LIFE?: NO
1. IN THE PAST MONTH, HAVE YOU WISHED YOU WERE DEAD OR WISHED YOU COULD GO TO SLEEP AND NOT WAKE UP?: NO
2. HAVE YOU ACTUALLY HAD ANY THOUGHTS OF KILLING YOURSELF?: NO

## 2023-10-20 ASSESSMENT — ACTIVITIES OF DAILY LIVING (ADL)
WALKS IN HOME: INDEPENDENT
DRESSING YOURSELF: INDEPENDENT
TOILETING: INDEPENDENT
DRESSING YOURSELF: INDEPENDENT
HEARING - RIGHT EAR: FUNCTIONAL
ADEQUATE_TO_COMPLETE_ADL: YES
BATHING: INDEPENDENT
PATIENT'S MEMORY ADEQUATE TO SAFELY COMPLETE DAILY ACTIVITIES?: YES
JUDGMENT_ADEQUATE_SAFELY_COMPLETE_DAILY_ACTIVITIES: YES
JUDGMENT_ADEQUATE_SAFELY_COMPLETE_DAILY_ACTIVITIES: YES
GROOMING: INDEPENDENT
TOILETING: INDEPENDENT
FEEDING YOURSELF: INDEPENDENT
BATHING: INDEPENDENT
HEARING - LEFT EAR: FUNCTIONAL
WALKS IN HOME: INDEPENDENT
GROOMING: INDEPENDENT
HEARING - RIGHT EAR: FUNCTIONAL
PATIENT'S MEMORY ADEQUATE TO SAFELY COMPLETE DAILY ACTIVITIES?: YES
ADEQUATE_TO_COMPLETE_ADL: YES
FEEDING YOURSELF: INDEPENDENT
HEARING - LEFT EAR: FUNCTIONAL

## 2023-10-20 ASSESSMENT — PAIN SCALES - GENERAL
PAINLEVEL_OUTOF10: 0 - NO PAIN
PAINLEVEL_OUTOF10: 2
PAINLEVEL_OUTOF10: 7
PAINLEVEL_OUTOF10: 7
PAINLEVEL_OUTOF10: 5 - MODERATE PAIN
PAINLEVEL_OUTOF10: 8
PAINLEVEL_OUTOF10: 7
PAINLEVEL_OUTOF10: 7
PAINLEVEL_OUTOF10: 3
PAINLEVEL_OUTOF10: 8

## 2023-10-20 ASSESSMENT — LIFESTYLE VARIABLES
HOW OFTEN DO YOU HAVE 6 OR MORE DRINKS ON ONE OCCASION: NEVER
HOW OFTEN DO YOU HAVE A DRINK CONTAINING ALCOHOL: NEVER
HOW MANY STANDARD DRINKS CONTAINING ALCOHOL DO YOU HAVE ON A TYPICAL DAY: PATIENT DOES NOT DRINK
SKIP TO QUESTIONS 9-10: 1
AUDIT-C TOTAL SCORE: 0
AUDIT-C TOTAL SCORE: 0

## 2023-10-20 ASSESSMENT — PATIENT HEALTH QUESTIONNAIRE - PHQ9
1. LITTLE INTEREST OR PLEASURE IN DOING THINGS: NOT AT ALL
SUM OF ALL RESPONSES TO PHQ9 QUESTIONS 1 & 2: 0
2. FEELING DOWN, DEPRESSED OR HOPELESS: NOT AT ALL

## 2023-10-20 ASSESSMENT — PAIN DESCRIPTION - DESCRIPTORS
DESCRIPTORS: CRAMPING
DESCRIPTORS: ACHING
DESCRIPTORS: ACHING

## 2023-10-20 NOTE — BRIEF OP NOTE
Date: 10/20/2023  OR Location: [facility] OR    Name: Dara Abdullahi, : 1960, Age: 62 y.o., MRN: [id number], Sex: female    Diagnosis  Pre-op Diagnosis     * Hiatal hernia [K44.9] Post-op Diagnosis     * Hiatal hernia [K44.9]     Procedures  Repair Diaphragmatic Hernia Laparoscopy with Esophageal Lengthening  95904 - NE LAPS RPR PARAESPHGL HRNA INCL FUNDPLSTY W/MESH      Surgeons      * Iman Mitchell - Primary    Resident/Fellow/Other Assistant:  Surgeon(s) and Role:    Procedure Summary  Anesthesia: Consult  ASA: III  Anesthesia Staff: CRNA: MARYLOU Leone-CRNA  Estimated Blood Loss: 10mL  Intra-op Medications:   Medication Name Total Dose   BUPivacaine-EPINEPHrine (Marcaine w/EPI) 0.5 %-1:200,000 injection 30 mL   sodium chloride 0.9% flush 30 mL   ceFAZolin (Ancef) 3 g in sodium chloride 0.9% 100 mL IV 2 g              Anesthesia Record               Intraprocedure I/O Totals          Intake    LR 1000.00 mL    Total Intake 1000 mL       Output    Urine 150 mL    Est. Blood Loss 10 mL    Total Output 160 mL       Net    Net Volume 840 mL          Specimen: No specimens collected     Staff:   Circulator: Sarah Dougherty RN  Scrub Person: [place]; Marcie Newman          Findings: hiatal hernia of the pouch in the chest     Complications:  None; patient tolerated the procedure well.     Disposition: PACU - hemodynamically stable.  Condition: stable  Specimens Collected: No specimens collected  Attending Attestation: I was present and scrubbed for the entire procedure.    Iman Mitchell  Phone Number: [phone]

## 2023-10-20 NOTE — ANESTHESIA PREPROCEDURE EVALUATION
Patient: Dara Abdullahi    Procedure Information       Date/Time: 10/20/23 0835    Procedure: Repair Diaphragmatic Hernia Laparoscopy with Esophageal Lengthening    Location: [facility] OR 08 / Virtual [facility]    Surgeons: Iman Mitchell MD MPH          Vitals:    10/20/23 0727   BP: 145/79   Pulse: 89   Resp: 16   Temp: 36.4 °C (97.5 °F)   SpO2: 98%       Past Surgical History:   Procedure Laterality Date    FOOT SURGERY  03/07/2017    Foot Surgery    HYSTERECTOMY  03/07/2017    Hysterectomy    INNER EAR SURGERY  03/07/2017    Inner Ear Surgery    OTHER SURGICAL HISTORY  10/05/2021    Varicose vein ligation    TUBAL LIGATION  03/07/2017    Tubal Ligation     Past Medical History:   Diagnosis Date    Diaphragmatic hernia without obstruction or gangrene 11/14/2022    Hiatal hernia with GERD    Dysphagia, unspecified     Dysphagia    Hyperlipidemia     Hypertension     Pain in leg, unspecified     Leg pain    Pain in unspecified foot     Foot pain    Personal history of other diseases of the musculoskeletal system and connective tissue     History of backache    Personal history of other diseases of the nervous system and sense organs 11/14/2022    History of obstructive sleep apnea       Current Facility-Administered Medications:     lactated Ringer's infusion, 100 mL/hr, intravenous, Continuous, Toni Loyola MD, Last Rate: 100 mL/hr at 10/20/23 0730, 100 mL/hr at 10/20/23 0730    Facility-Administered Medications Ordered in Other Encounters:     lactated Ringer's bolus, , intravenous, Continuous PRN, Paul Connor, APRN-CRNA, New Bag at 10/20/23 0716  Prior to Admission medications    Medication Sig Start Date End Date Taking? Authorizing Provider   cyanocobalamin (Vitamin B-12) 50 mcg tablet Take by mouth once daily. Pt unsure of doose   Yes Historical Provider, MD   ergocalciferol, vitamin D2, 62.5 mcg (2,500 unit) capsule Take 2 capsules (125 mcg) by mouth once daily. 8/11/21  Yes Historical Provider, MD    lisinopril 5 mg tablet Take 1 tablet (5 mg) by mouth once daily. 3/31/23 3/30/24 Yes Dara Carpenter DO   multivitamin tablet Take 1 tablet by mouth once daily.   Yes Historical Provider, MD   rosuvastatin (Crestor) 10 mg tablet Take 1 tablet (10 mg) by mouth once daily. 3/31/23 3/30/24 Yes Dara Carpenter DO   omeprazole (PriLOSEC) 40 mg DR capsule Take 1 capsule (40 mg) by mouth once daily in the morning. Take before meals. Please remove capsule and use granules and mix with sugar free pudding or jello 10/19/23 2/16/24  Tobias Mendieta MD   ondansetron ODT (Zofran-ODT) 4 mg disintegrating tablet Take 1 tablet (4 mg) by mouth every 8 hours if needed for nausea or vomiting. 10/19/23 11/18/23  Tobias Mendieta MD   oxyCODONE (Roxicodone) 5 mg/5 mL solution Take 5 mL (5 mg) by mouth every 6 hours if needed for severe pain (7 - 10). 10/19/23   Tobias Mendieta MD     No Known Allergies  Social History     Tobacco Use    Smoking status: Never    Smokeless tobacco: Never   Substance Use Topics    Alcohol use: Never         Chemistry    Lab Results   Component Value Date/Time     09/28/2023 0947    K 3.3 (L) 09/28/2023 0947     09/28/2023 0947    CO2 31 09/28/2023 0947    BUN 9 09/28/2023 0947    CREATININE 0.69 09/28/2023 0947    Lab Results   Component Value Date/Time    CALCIUM 9.1 09/28/2023 0947    ALKPHOS 74 09/28/2023 0947    AST 39 09/28/2023 0947    ALT 50 (H) 09/28/2023 0947    BILITOT 0.5 09/28/2023 0947          Lab Results   Component Value Date/Time    WBC 4.0 (L) 09/28/2023 0947    HGB 12.1 09/28/2023 0947    HCT 37.4 09/28/2023 0947     09/28/2023 0947     Lab Results   Component Value Date/Time    PROTIME 11.8 09/28/2023 0947    INR 1.0 09/28/2023 0947     No results found for this or any previous visit (from the past 4464 hour(s)).  No results found for this or any previous visit from the past 1095 days.     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  Patient was not previously instructed to abstain from smoking on day of procedure.  Patient did not smoke on day of procedure.  Education provided regarding risk of obstructive sleep apnea.  intravenous induction   Anesthetic plan and risks discussed with patient.    Plan discussed with CRNA.

## 2023-10-20 NOTE — ANESTHESIA PROCEDURE NOTES
Airway  Date/Time: 10/20/2023 9:37 AM  Urgency: elective    Airway not difficult    Staffing  Performed: CRNA   Authorized by: PERRI Leone    Performed by: PERRI Leone  Patient location during procedure: OR    Indications and Patient Condition  Indications for airway management: anesthesia and airway protection  Spontaneous ventilation: present  Sedation level: deep  Preoxygenated: yes  Patient position: sniffing  Mask difficulty assessment: 1 - vent by mask  Planned trial extubation    Final Airway Details  Final airway type: endotracheal airway      Successful airway: ETT     Successful intubation technique: direct laryngoscopy  Facilitating devices/methods: intubating stylet  Endotracheal tube insertion site: left naris  Blade: Pablo  Blade size: #3  ETT size (mm): 7.5  Cormack-Lehane Classification: grade I - full view of glottis  Placement verified by: chest auscultation   Measured from: lips  ETT to lips (cm): 21  Number of attempts at approach: 1  Ventilation between attempts: BVM  Number of other approaches attempted: 0

## 2023-10-20 NOTE — H&P
History Of Present Illness  Dara Abdullahi is a 62F hx of lap bypass and hiatal hernia repair on 8/4/22 who presents with persistent abdominal pain and nausea after every meal. She presents today virtually to go over CT scan results. She had a recent esophagram that revealed a hiatal hernia. On CT, we see some esophagitis and about a 3 cm HH with the top of the pouch slipped up. She reports nausea nad pain with every meal and reports that she avoids protein because of those reasons. The patient has stopped taking omeprazole because she found it wasn't helping. She believes she had been prescribed zofran in the past without relief from the nausea      Past Medical History  Past Medical History:   Diagnosis Date    Diaphragmatic hernia without obstruction or gangrene 11/14/2022    Hiatal hernia with GERD    Dysphagia, unspecified     Dysphagia    Pain in leg, unspecified     Leg pain    Pain in unspecified foot     Foot pain    Personal history of other diseases of the musculoskeletal system and connective tissue     History of backache    Personal history of other diseases of the nervous system and sense organs 11/14/2022    History of obstructive sleep apnea       Surgical History  Past Surgical History:   Procedure Laterality Date    FOOT SURGERY  03/07/2017    Foot Surgery    HYSTERECTOMY  03/07/2017    Hysterectomy    INNER EAR SURGERY  03/07/2017    Inner Ear Surgery    OTHER SURGICAL HISTORY  10/05/2021    Varicose vein ligation    TUBAL LIGATION  03/07/2017    Tubal Ligation        Social History  She reports that she has never smoked. She has never used smokeless tobacco. She reports that she does not drink alcohol and does not use drugs.    Family History  Family History   Problem Relation Name Age of Onset    Other (cardiac disorder) Mother      Diabetes Mother      Pancreatic cancer Mother      Diabetes type II Mother          without complications    Cancer Mother      Breast cancer Mother      Alcohol  abuse Father      Other (carbon monoxide poisoning) Father      Alzheimer's disease Father      Lung cancer Brother          Allergies  Patient has no known allergies.    Review of Systems   Constitutional:  Negative for chills and fever.   HENT:  Negative for facial swelling, nosebleeds, rhinorrhea and sinus pain.    Respiratory:  Negative for apnea, choking and chest tightness.    Cardiovascular:  Negative for chest pain, palpitations and leg swelling.   Gastrointestinal:  Negative for nausea and vomiting.   Endocrine: Negative for polydipsia, polyphagia and polyuria.   Genitourinary:  Negative for difficulty urinating, flank pain and urgency.   Skin:  Negative for color change, pallor and rash.   Neurological:  Negative for tremors, light-headedness and numbness.   Psychiatric/Behavioral:  Negative for agitation, behavioral problems, confusion, hallucinations and self-injury.         Physical Exam  Vitals reviewed.   Constitutional:       General: She is not in acute distress.     Appearance: She is not toxic-appearing.   Eyes:      Conjunctiva/sclera: Conjunctivae normal.   Cardiovascular:      Pulses: Normal pulses.   Pulmonary:      Effort: Pulmonary effort is normal. No respiratory distress.   Chest:      Chest wall: No tenderness.   Abdominal:      General: Abdomen is flat. There is no distension.      Palpations: Abdomen is soft.      Tenderness: There is no abdominal tenderness.   Musculoskeletal:         General: No swelling or deformity. Normal range of motion.      Cervical back: Normal range of motion and neck supple.   Skin:     General: Skin is warm and dry.   Neurological:      General: No focal deficit present.      Mental Status: Mental status is at baseline.   Psychiatric:         Mood and Affect: Mood normal.         Judgment: Judgment normal.          Last Recorded Vitals  Blood pressure 145/79, pulse 89, temperature 36.4 °C (97.5 °F), temperature source Temporal, resp. rate 16, SpO2 98  %.    Relevant Results        === 06/16/23 ===    CT ABDOMEN PELVIS W IV CONTRAST    - Impression -  1. Postsurgical changes of Warren-en-Y gastric bypass with an  associated small hiatal hernia. Mild concentric thickening of the  distal thoracic esophagus is suggestive esophagitis. Correlate with  history of gastroesophageal reflux. There is also some fluid within  the excluded stomach of uncertain significance. Clinical correlation  and follow-up advised.  2. Approximate 1.2 cm nodular density within the anterior left lower  abdomen abutting and just beneath the left anterior abdominal wall as  described of uncertain significance. Follow-up to ensure stability is  recommended.  3. Mild diffuse bilateral hydroureter noted without definite evidence  of obstructing stones or lesions.  4. Small free pelvic fluid.  5. A 4-5 mm pleural based nodule in the left lower lobe most likely  represents a benign intrapulmonary lymph node. No further follow-up  is required, however, if the patient has high risk factors for  primary lung malignancy, follow-up noncontrast CT scan chest in 12  months may be obtained. (Edmond Hansonhoconnie et al., Guidelines for  management of incidental pulmonary nodules detected on CT images:  From the Fleischner Society 2017, Radiology. 2017 Jul;284  (1):228-243.) [email or web address]  6. Additional findings as above.    I personally reviewed the images/study and I agree with the findings  as stated. This study was interpreted at [facility], [place], [place].       Assessment/Plan   Principal Problem:    Hiatal hernia      Pt is a  62 s/p rny gastric bypass with previous hhr now here for hhr        I spent 25 minutes in the professional and overall care of this patient.      Tobias Mendieta MD

## 2023-10-20 NOTE — ANESTHESIA POSTPROCEDURE EVALUATION
Patient: Dara Abdullahi    Procedure Summary       Date: 10/20/23 Room / Location: [facility] OR 08 / Virtual [facility] OR    Anesthesia Start: 0930 Anesthesia Stop: 1155    Procedure: Repair Diaphragmatic Hernia Laparoscopy with Esophageal Lengthening Diagnosis:       Hiatal hernia      (Hiatal hernia [K44.9])    Surgeons: Iman Mitchell MD MPH Responsible Provider: PERRI Leone    Anesthesia Type: general ASA Status: 3            Anesthesia Type: general    Vitals Value Taken Time   /68 10/20/23 1330   Temp 36.3 °C (97.3 °F) 10/20/23 1200   Pulse 84 10/20/23 1339   Resp  10/20/23 1533   SpO2 99 % 10/20/23 1339   Vitals shown include unvalidated device data.    Anesthesia Post Evaluation    Patient location during evaluation: PACU  Patient participation: complete - patient participated  Level of consciousness: awake  Pain management: adequate  Multimodal analgesia pain management approach  Airway patency: patent  Two or more strategies used to mitigate risk of obstructive sleep apnea  Cardiovascular status: acceptable  Respiratory status: acceptable  Hydration status: acceptable        No notable events documented.

## 2023-10-21 ENCOUNTER — APPOINTMENT (OUTPATIENT)
Dept: RADIOLOGY | Facility: HOSPITAL | Age: 63
End: 2023-10-21
Payer: MEDICARE

## 2023-10-21 VITALS
DIASTOLIC BLOOD PRESSURE: 78 MMHG | HEIGHT: 67 IN | SYSTOLIC BLOOD PRESSURE: 144 MMHG | TEMPERATURE: 98.8 F | BODY MASS INDEX: 22.35 KG/M2 | WEIGHT: 142.42 LBS | RESPIRATION RATE: 17 BRPM | OXYGEN SATURATION: 97 % | HEART RATE: 66 BPM

## 2023-10-21 PROCEDURE — 2500000004 HC RX 250 GENERAL PHARMACY W/ HCPCS (ALT 636 FOR OP/ED): Performed by: ANESTHESIOLOGY

## 2023-10-21 PROCEDURE — 96372 THER/PROPH/DIAG INJ SC/IM: CPT | Performed by: STUDENT IN AN ORGANIZED HEALTH CARE EDUCATION/TRAINING PROGRAM

## 2023-10-21 PROCEDURE — 2550000001 HC RX 255 CONTRASTS: Performed by: SURGERY

## 2023-10-21 PROCEDURE — 2500000004 HC RX 250 GENERAL PHARMACY W/ HCPCS (ALT 636 FOR OP/ED): Performed by: STUDENT IN AN ORGANIZED HEALTH CARE EDUCATION/TRAINING PROGRAM

## 2023-10-21 PROCEDURE — 74240 X-RAY XM UPR GI TRC 1CNTRST: CPT

## 2023-10-21 PROCEDURE — 74220 X-RAY XM ESOPHAGUS 1CNTRST: CPT | Performed by: STUDENT IN AN ORGANIZED HEALTH CARE EDUCATION/TRAINING PROGRAM

## 2023-10-21 PROCEDURE — G0378 HOSPITAL OBSERVATION PER HR: HCPCS

## 2023-10-21 PROCEDURE — 7100000011 HC EXTENDED STAY RECOVERY HOURLY - NURSING UNIT

## 2023-10-21 RX ADMIN — HEPARIN SODIUM 5000 UNITS: 5000 INJECTION INTRAVENOUS; SUBCUTANEOUS at 05:40

## 2023-10-21 RX ADMIN — KETOROLAC TROMETHAMINE 15 MG: 15 INJECTION, SOLUTION INTRAMUSCULAR; INTRAVENOUS at 05:39

## 2023-10-21 RX ADMIN — SODIUM CHLORIDE, POTASSIUM CHLORIDE, SODIUM LACTATE AND CALCIUM CHLORIDE 100 ML/HR: 600; 310; 30; 20 INJECTION, SOLUTION INTRAVENOUS at 07:05

## 2023-10-21 RX ADMIN — DIATRIZOATE MEGLUMINE AND DIATRIZOATE SODIUM 30 ML: 660; 100 LIQUID ORAL; RECTAL at 09:15

## 2023-10-21 ASSESSMENT — COGNITIVE AND FUNCTIONAL STATUS - GENERAL
DAILY ACTIVITIY SCORE: 24
MOBILITY SCORE: 24

## 2023-10-21 ASSESSMENT — ACTIVITIES OF DAILY LIVING (ADL): LACK_OF_TRANSPORTATION: NO

## 2023-10-21 ASSESSMENT — PAIN SCALES - GENERAL
PAINLEVEL_OUTOF10: 0 - NO PAIN
PAINLEVEL_OUTOF10: 0 - NO PAIN

## 2023-10-21 ASSESSMENT — PAIN - FUNCTIONAL ASSESSMENT: PAIN_FUNCTIONAL_ASSESSMENT: 0-10

## 2023-10-21 NOTE — DISCHARGE SUMMARY
Discharge Diagnosis  Hiatal hernia    Issues Requiring Follow-Up  Postop - hiatal hernia repair    Hospital Course   62F s/p hiatal hernia repair meeting criteria for discharge.   Her UGI was unremarkable. Tolerated her full liquid diet without issues. Pain is under control, ambulating, and had no complaints.    Pertinent Physical Exam At Time of Discharge  Physical Exam  Vitals reviewed.   Constitutional:       Appearance: Normal appearance. She is obese.   HENT:      Head: Normocephalic and atraumatic.      Nose: Nose normal.      Mouth/Throat:      Mouth: Mucous membranes are moist.   Eyes:      Extraocular Movements: Extraocular movements intact.      Pupils: Pupils are equal, round, and reactive to light.   Cardiovascular:      Rate and Rhythm: Normal rate and regular rhythm.   Pulmonary:      Effort: Pulmonary effort is normal.   Abdominal:      General: Distension: mild.      Palpations: Abdomen is soft.      Tenderness: There is abdominal tenderness (appropriate incisional).      Comments: Incisions c/d/I.    Musculoskeletal:         General: Normal range of motion.      Cervical back: Normal range of motion and neck supple.   Skin:     General: Skin is warm and dry.      Capillary Refill: Capillary refill takes less than 2 seconds.   Neurological:      General: No focal deficit present.      Mental Status: She is alert.   Psychiatric:         Mood and Affect: Mood normal.         Behavior: Behavior normal.         Thought Content: Thought content normal.         Judgment: Judgment normal.         Home Medications     Medication List      START taking these medications     omeprazole 40 mg DR capsule; Commonly known as: PriLOSEC; Take 1 capsule   (40 mg) by mouth once daily in the morning. Take before meals. Please   remove capsule and use granules and mix with sugar free pudding or jello   ondansetron ODT 4 mg disintegrating tablet; Commonly known as:   Zofran-ODT; Take 1 tablet (4 mg) by mouth every 8  hours if needed for   nausea or vomiting.   oxyCODONE 5 mg/5 mL solution; Commonly known as: Roxicodone; Take 5 mL   (5 mg) by mouth every 6 hours if needed for severe pain (7 - 10).     CONTINUE taking these medications     cyanocobalamin 50 mcg tablet; Commonly known as: Vitamin B-12   ergocalciferol (vitamin D2) 62.5 mcg (2,500 unit) capsule   lisinopril 5 mg tablet; Take 1 tablet (5 mg) by mouth once daily.   multivitamin tablet   rosuvastatin 10 mg tablet; Commonly known as: Crestor; Take 1 tablet (10   mg) by mouth once daily.       Outpatient Follow-Up  Future Appointments   Date Time Provider Department Center   3/28/2024  9:20 AM Dara Carpenter DO [id number] Chandana Hughes MD

## 2023-10-21 NOTE — CARE PLAN
The patient's goals for the shift include      The clinical goals for the shift include pain    Over the shift, the patient did not make progress toward the following goals. Barriers to progression include pain. Recommendations to address these barriers include pain meds.

## 2023-10-21 NOTE — NURSING NOTE
Discharge instructions reviewed and provided to patient. Patient verbalized understanding. Patient also provided with printed information on full liquid, pureed, and soft diet.

## 2023-10-21 NOTE — PROGRESS NOTES
10/21/23 1047   Discharge Planning   Living Arrangements Children   Support Systems Children   Assistance Needed patient alert and oriented x3, independent in ADL's, no DME and drives   Type of Residence Private residence   Home or Post Acute Services None   Patient expects to be discharged to: Home no needs   Does the patient need discharge transport arranged? No   Financial Resource Strain   How hard is it for you to pay for the very basics like food, housing, medical care, and heating? Not hard   Housing Stability   In the last 12 months, was there a time when you were not able to pay the mortgage or rent on time? N   In the last 12 months, how many places have you lived? 1   In the last 12 months, was there a time when you did not have a steady place to sleep or slept in a shelter (including now)? N   Transportation Needs   In the past 12 months, has lack of transportation kept you from medical appointments or from getting medications? no   In the past 12 months, has lack of transportation kept you from meetings, work, or from getting things needed for daily living? No

## 2023-10-21 NOTE — PROGRESS NOTES
"Dara Abdullahi is a 62 y.o. female on day 0 of admission presenting with Hiatal hernia.    Subjective   Patient is doing well, tolerating clears without nausea/vomiting. Has ambulated, voiding and pain is under control.      Objective     Physical Exam  Vitals reviewed.   Constitutional:       Appearance: Normal appearance. She is obese.   HENT:      Head: Normocephalic and atraumatic.      Nose: Nose normal.      Mouth/Throat:      Mouth: Mucous membranes are moist.   Eyes:      Extraocular Movements: Extraocular movements intact.      Pupils: Pupils are equal, round, and reactive to light.   Cardiovascular:      Rate and Rhythm: Normal rate and regular rhythm.   Pulmonary:      Effort: Pulmonary effort is normal.   Abdominal:      General: There is distension (mild).      Palpations: Abdomen is soft.      Tenderness: There is abdominal tenderness (appropriate incisional).      Comments: Incisions c/d/I. Binder on   Musculoskeletal:         General: Normal range of motion.      Cervical back: Normal range of motion and neck supple.   Skin:     General: Skin is warm and dry.      Capillary Refill: Capillary refill takes less than 2 seconds.   Neurological:      General: No focal deficit present.      Mental Status: She is alert.   Psychiatric:         Mood and Affect: Mood normal.         Behavior: Behavior normal.         Thought Content: Thought content normal.         Judgment: Judgment normal.         Last Recorded Vitals  Blood pressure 126/82, pulse 84, temperature 36 °C (96.8 °F), resp. rate 16, height 1.702 m (5' 7\"), weight 64.6 kg (142 lb 6.7 oz), SpO2 96 %.  Intake/Output last 3 Shifts:  I/O last 3 completed shifts:  In: 4093 (63.4 mL/kg) [P.O.:360; I.V.:2733 (42.3 mL/kg); IV Piggyback:1000]  Out: 1260 (19.5 mL/kg) [Urine:1250 (0.5 mL/kg/hr); Blood:10]  Weight: 64.6 kg     Relevant Results                 Assessment/Plan   Principal Problem:    Hiatal hernia  Active Problems:    Dysphagia    62F w/ h/o " RYGB s/p hiatal hernia repair progressing well.    -UGI reviewed - advancing to a full liquid diet   -continue IVF till discharge  -Pain and nausea control PRN  -SQH , SCDs, and ambulation  Tentative discharge later today once she tolerates her FLD    Nadine Hughes MD

## 2023-10-21 NOTE — DISCHARGE INSTRUCTIONS
Medications:  - Medications should be crushed and mixed with full liquids. Capsules may be opened and mixed with full liquids.  - Take 650mg Tylenol (liquid preferred) every 6 hours for pain. Take between doses of your opioid pain medication. Try to limit the use of your opioid pain medication and only take as needed.  - If you have medications that you still cannot tolerate by your first visit with your surgeon or dietitian, please discuss this.   - You should be receiving or already have received the following medications for your postoperative course:  antinausea medication (ondansetron, metoclopramide), and pain medication (oxycodone, morphine, hydromorphone, hydrocodone). If you are missing any of these, please call the office immediately so we can prescribe them for you.    Constipation  - Take fiber (benefiber or metamucil) twice daily. Please also take colace (docusate) twice a day while taking oxycodone or other opiates. If you remain constipated (no bowel movement for 2 days) despite these medications, please take milk of magnesia and call the office to notify us.    Activity instructions:   - No lifting, pulling, or pushing objects greater than 15 pounds for 6weeks to 3 months for first time operation, if this redo case or revision then you have to wait 3-6 mouths with weight restriction   - Activity otherwise as tolerated.   - You may shower. Soap and water may run over your incisions. Pat dry. No submerging in baths or  swimming (activities that keep your incisions underwater) for at least two weeks.    - You may not drive while taking narcotics.     Diet:   - You will go home on a full liquid diet (similar to the diet you were on your final day in the hospital). Acceptable full liquids include protein shakes, sugar free jello, sugar free pudding, and creamy soups (no chunks).  You will stay on this full liquid diet on days 1-5 post discharge.    - You will then go to pureed foods (things you smear with  a butter knife) on days 6-10 post discharge.   - You will then go to soft diet (things you can mash with a fork) on days 11-15 post discharge.   - For further information, follow the diet instructions listed in your surgery instruction packet.     Call Provider If:  - Breathing faster or harder than normal.   - Fever of 100.4 F (38 C) or higher or feeling of chills.   - Feeling very sleepy and difficult to awaken.   - Inability to drink or significant decrease in ability to drink since discharge.   - Vomiting (throwing up) and not able to eat or drink for 12 hours.   - Urinating much less than your normal.  - More than 4 loose, watery bowel movements in 24 hours (diarrhea).   - Any new concerning symptoms.

## 2023-10-21 NOTE — CARE PLAN
Problem: Pain - Adult  Goal: Verbalizes/displays adequate comfort level or baseline comfort level  Outcome: Progressing     Problem: Safety - Adult  Goal: Free from fall injury  Outcome: Progressing     Problem: Discharge Planning  Goal: Discharge to home or other facility with appropriate resources  Outcome: Progressing     Problem: Chronic Conditions and Co-morbidities  Goal: Patient's chronic conditions and co-morbidity symptoms are monitored and maintained or improved  Outcome: Progressing     Problem: Fall/Injury  Goal: Not fall by end of shift  Outcome: Progressing  Goal: Be free from injury by end of the shift  Outcome: Progressing  Goal: Verbalize understanding of personal risk factors for fall in the hospital  Outcome: Progressing  Goal: Verbalize understanding of risk factor reduction measures to prevent injury from fall in the home  Outcome: Progressing  Goal: Use assistive devices by end of the shift  Outcome: Progressing  Goal: Pace activities to prevent fatigue by end of the shift  Outcome: Progressing     Problem: Pain  Goal: Takes deep breaths with improved pain control throughout the shift  Outcome: Progressing  Goal: Turns in bed with improved pain control throughout the shift  Outcome: Progressing  Goal: Walks with improved pain control throughout the shift  Outcome: Progressing  Goal: Performs ADL's with improved pain control throughout shift  Outcome: Progressing  Goal: Participates in PT with improved pain control throughout the shift  Outcome: Progressing  Goal: Free from opioid side effects throughout the shift  Outcome: Progressing  Goal: Free from acute confusion related to pain meds throughout the shift  Outcome: Progressing     Problem: Meds/Post-op Pain  Goal: Pain controlled to tolerate pain level  Outcome: Progressing  Goal: Tolerates prescribed medication  Outcome: Progressing     Problem: DVT/VTE Prevention/Activity  Goal: No decrease in circulation/sensation  Outcome:  Progressing  Goal: Prevent skin breakdown  Outcome: Progressing  Goal: Return to preop oxygenation status  Outcome: Progressing  Goal: Tolerates optimal activity  Outcome: Progressing  Goal: Increase self care and/or family involvement in 24 hrs.  Outcome: Progressing     Problem: Wound care/infection prevention  Goal: No signs of infection in 24 hrs.  Outcome: Progressing  Goal: No unexpected bleeding from incision this shift  Outcome: Progressing     Problem: Diet/fluid balance  Goal: Adequate urinary output  Outcome: Progressing  Goal: Free from nausea/vomiting  Outcome: Progressing  Goal: Return in bowel function  Outcome: Progressing  Goal: Tolerates prescribed diet  Outcome: Progressing     Problem: Other goals  Goal: No change in neurological status  Outcome: Progressing  Goal: Stabilize vital signs (return to 10% of baseline)  Outcome: Progressing     Problem: Indwelling Catheter Maintenance  Goal: I will have no complications from indwelling catheter  Outcome: Progressing   The patient's goals for the shift include      The clinical goals for the shift include Tolerate diet    Over the shift, the patient did not make progress toward the following goals. Barriers to progression include none. Recommendations to address these barriers include none.

## 2023-10-21 NOTE — CARE PLAN
The patient's goals for the shift include      Problem: Pain - Adult  Goal: Verbalizes/displays adequate comfort level or baseline comfort level  Outcome: Progressing     Problem: Safety - Adult  Goal: Free from fall injury  Outcome: Progressing       The clinical goals for the shift include pain control    Over the shift, the patient did not make progress toward the following goals. Barriers to progression include pain . Recommendations to address these barriers include pain control  .

## 2023-10-26 NOTE — OP NOTE
Repair Diaphragmatic Hernia Laparoscopy with Esophageal Lengthening Operative Note     Date: 10/20/2023  OR Location: [facility] OR    Name: Dara Abdullahi, : 1960, Age: 62 y.o., MRN: [id number], Sex: female    Diagnosis  Pre-op Diagnosis     * Hiatal hernia [K44.9] Post-op Diagnosis     * Hiatal hernia [K44.9]     Procedures  Repair Diaphragmatic Hernia Laparoscopy with Esophageal Lengthening  [id number] - MA LAPS RPR PARAESPHGL HRNA INCL FUNDPLSTY W/MESH      Surgeons      * Iman Mitchell - Primary    Resident/Fellow/Other Assistant:  Surgeon(s) and Role:  First Assistant Tobias Mendieta, no qualified resident available to assist with this case  Procedure Summary  Anesthesia: Consult  ASA: III  Anesthesia Staff: CRNA: MARYLOU Leone-CRNA  Estimated Blood Loss: 20 mL  Intra-op Medications:   Medication Name Total Dose   BUPivacaine-EPINEPHrine (Marcaine w/EPI) 0.5 %-1:200,000 injection 30 mL   sodium chloride 0.9% flush 30 mL   ceFAZolin (Ancef) 3 g in sodium chloride 0.9% 100 mL IV 2 g         Intraprocedure I/O Totals       None           Specimen: No specimens collected     Staff:   Circulator: Sarah Dougherty RN  Scrub Person: [place]; Marcie Newman         Drains and/or Catheters:   Urethral Catheter (Active)   Site Assessment Clean;Skin intact 10/20/23 1510   Collection Container Standard drainage bag 10/20/23 1200   Reason for Continuing Urinary Catheterization no longer indicated (REMOVE catheter, NO order needed) 10/21/23 0900   Output (mL) 200 mL 10/21/23 0900   $ Urethral Catheter Charge Indwelling cath 10/20/23 1200       Tourniquet Times:         Implants:  Implants       Type Name Action Serial No.      Graft SHEET, FLAT, GORE BIO-A TISSUE REINFORCE, 7 X 10 CM - [id number] - [id number] Implanted [id number]              Findings: mod sized hiatal hernia, sutures from prior repair intact, dense adhesions    Indications: Dara Abdullahi is an 62 y.o. female who is having surgery for Hiatal hernia  [K44.9]. This is recurrent and was repaired at the time of her bypass.  She has had increasing difficulty eating and on her work-up it was noted that about half of her pouch was in her chest.  She continues to lose weight and has lost another 4 pounds in the last couple of weeks.  She was taken to the operating room for repair of the recurrent hernia    The patient was seen in the preoperative area. The risks, benefits, complications, treatment options, non-operative alternatives, expected recovery and outcomes were discussed with the patient. The possibilities of reaction to medication, pulmonary aspiration, injury to surrounding structures, bleeding, recurrent infection, the need for additional procedures, failure to diagnose a condition, and creating a complication requiring transfusion or operation were discussed with the patient. The patient concurred with the proposed plan, giving informed consent.  The site of surgery was properly noted/marked if necessary per policy. The patient has been actively warmed in preoperative area. Preoperative antibiotics have been ordered and given within 1 hours of incision. Venous thrombosis prophylaxis have been ordered including bilateral sequential compression devices    Procedure Details: The patient was brought to the operating room and placed in the split leg position.  General anesthesia was induced.  She was prepped and draped in the usual sterile fashion.  Incision was made 15 cm from xiphoid process just left of midline and carried down to the level of the fascia.  The fascia was incised sharply exposing the rectus underneath.  The rectus was split exposing the posterior fascia.  This was grasped with hemostats and incised sharply with entering the peritoneal cavity under direct visualization.  We then inspected the abdomen and noted multiple adhesions to the anterior abdominal wall a 5 mm trocar was placed in the left anterior axillary line and the adhesions were  taken down using blunt and harmonic dissection.  A tap block was placed on the left.  Another was placed on the right.  A total of 60 cc of quarter percent Marcaine was placed.  We then took the adhesions down from the underside of the left lobe of the liver.  Of note her adhesions were quite dense making this an extremely tedious and difficult dissection.  Once we were able to clear the underside of the left lobe of the liver and Ashia retractor was placed beneath the left lobe of the liver and used for gentle retraction and exposure of the esophageal hiatus.  This was held in place affixed to the side of the bed utilizing a [facility] retractor.  We then proceeded with identifying the herniated contents and the crura.  We had a tough time identifying the medial border of the crura due to the dense nature of the adhesions.  Once we were able to find the right suri we continued along this up to the apex.  The pledgets and sutures from the prior repair were evident.  We continued with an extensive mediastinal dissection and cleared the suri on the left side.  As we were able we brought a Penrose around the esophagus and used it for gentle retraction for the duration of the case.  Both the anterior and posterior vagus were identified and kept against the esophagus for the entirety of the case.  We had to peel the sac off of the pleura bilaterally and the pericardium anteriorly and the aorto esophageal fascia posteriorly.  This allowed complete mobilization of the esophagus.  Meticulous hemostasis was maintained and we were able to achieve 3 cm of intra-abdominal esophagus.  The pouch was well reduced into the peritoneal cavity.  We then closed the hiatus using 0 silk sutures reinforced with pledgets.  For such sutures were placed posteriorly.  No more anterior sutures were placed.  Endoscopy was performed the scope was introduced transorally and the esophagus intubated easily.  The esophagus appeared normal we had  about 3 cm of intra-abdominal esophagus and a nice 5 cm pouch.  No dilation of the pouch was noted no excessive candycane was noted.  The scope passed easily through the pouch into the Warren limb.  At this point we were satisfied that our repair was complete.  This extensive adhesiolysis was extremely difficult and increase the complexity of the case by almost 50%.  We inspected the abdomen for any signs of bleeding or bowel injury none were noted the supraumbilical port was closed using 0 Vicryl suture placed in simple fashion using a laparoscopic suture passer.  The abdomen was related trocars removed skin was closed all sites using 4-0 Vicryl suture placed at similar fashion.  Prineo was placed on the wounds.  The patient was weakness seizure and taken recovery in good condition  Complications:  None; patient tolerated the procedure well.    Disposition: PACU - hemodynamically stable.  Condition: stable     Also the hiatal hernia repair was augmented with a bio a mesh placed in a semicircumferential fashion.  This was secured to the right and left crura using 2-0 silk sutures on either side    Additional Details: None    Attending Attestation: I was present and scrubbed for the entire procedure.    Iman Mitchell  Phone Number: [phone]

## 2023-12-30 PROBLEM — E66.01 MORBID (SEVERE) OBESITY DUE TO EXCESS CALORIES (MULTI): Status: ACTIVE | Noted: 2023-12-30

## 2024-03-28 ENCOUNTER — OFFICE VISIT (OUTPATIENT)
Dept: PRIMARY CARE | Facility: CLINIC | Age: 64
End: 2024-03-28
Payer: MEDICARE

## 2024-03-28 VITALS
HEART RATE: 98 BPM | SYSTOLIC BLOOD PRESSURE: 118 MMHG | DIASTOLIC BLOOD PRESSURE: 70 MMHG | BODY MASS INDEX: 22.4 KG/M2 | WEIGHT: 143 LBS | OXYGEN SATURATION: 96 %

## 2024-03-28 DIAGNOSIS — E78.5 DYSLIPIDEMIA: ICD-10-CM

## 2024-03-28 DIAGNOSIS — I10 PRIMARY HYPERTENSION: ICD-10-CM

## 2024-03-28 DIAGNOSIS — M79.672 BILATERAL LEG AND FOOT PAIN: ICD-10-CM

## 2024-03-28 DIAGNOSIS — M79.605 BILATERAL LEG AND FOOT PAIN: ICD-10-CM

## 2024-03-28 DIAGNOSIS — M48.062 PSEUDOCLAUDICATION SYNDROME: Primary | ICD-10-CM

## 2024-03-28 DIAGNOSIS — M19.90 ARTHRITIS: ICD-10-CM

## 2024-03-28 DIAGNOSIS — R53.81 CHRONIC FATIGUE AND MALAISE: ICD-10-CM

## 2024-03-28 DIAGNOSIS — M79.671 BILATERAL LEG AND FOOT PAIN: ICD-10-CM

## 2024-03-28 DIAGNOSIS — M79.7 FIBROMYALGIA: ICD-10-CM

## 2024-03-28 DIAGNOSIS — R53.82 CHRONIC FATIGUE AND MALAISE: ICD-10-CM

## 2024-03-28 DIAGNOSIS — Z11.59 ENCOUNTER FOR HEPATITIS C SCREENING TEST FOR LOW RISK PATIENT: ICD-10-CM

## 2024-03-28 DIAGNOSIS — M79.604 BILATERAL LEG AND FOOT PAIN: ICD-10-CM

## 2024-03-28 PROCEDURE — G2211 COMPLEX E/M VISIT ADD ON: HCPCS | Performed by: FAMILY MEDICINE

## 2024-03-28 PROCEDURE — 3074F SYST BP LT 130 MM HG: CPT | Performed by: FAMILY MEDICINE

## 2024-03-28 PROCEDURE — 99214 OFFICE O/P EST MOD 30 MIN: CPT | Performed by: FAMILY MEDICINE

## 2024-03-28 PROCEDURE — 3078F DIAST BP <80 MM HG: CPT | Performed by: FAMILY MEDICINE

## 2024-03-28 PROCEDURE — 1036F TOBACCO NON-USER: CPT | Performed by: FAMILY MEDICINE

## 2024-03-28 RX ORDER — ROSUVASTATIN CALCIUM 10 MG/1
10 TABLET, COATED ORAL DAILY
Qty: 90 TABLET | Refills: 3 | Status: SHIPPED | OUTPATIENT
Start: 2024-03-28 | End: 2025-03-28

## 2024-03-28 RX ORDER — LISINOPRIL 5 MG/1
5 TABLET ORAL DAILY
Qty: 90 TABLET | Refills: 3 | Status: SHIPPED | OUTPATIENT
Start: 2024-03-28 | End: 2025-03-28

## 2024-03-28 NOTE — PATIENT INSTRUCTIONS
Glad the hernia surgery was helpful     BP looks good today at 118/70     Mammogram on 10/19/23 was good , repeat yearly.     Get some updated labs prior to next appt.     Call if you need any medication refills today     Pleas also schedule an updated bone density screen     Monitor the tremor, see if stress makes it worse or a drink of alcohol makes it better- this is more likely essential tremor.     We did find the colonoscopy it was down in 2017. Repeat in 2027.     Follow up in 6 months for follow up  and Medicare Wellness or sooner as needed.

## 2024-03-28 NOTE — PROGRESS NOTES
"Subjective   Reason for Visit: Dara Abdullahi is an 63 y.o. female here for  6 month follow      Reviewed all medications by prescribing practitioner or clinical pharmacist (such as prescriptions, OTCs, herbal therapies and supplements) and documented in the medical record.    On SS/disability since last visit   Pt had a hysterectomy, no need for PAP    HPI    #) obesity with comorbidities - had Bariatric surgery in Aug 2022.   - BMI 36--> 31--> 32 --> 22.4   - START WEIGHT: 255  - CURRENT WEIGHT: 235--> 199--> 173 --> 148 --> 143   - 8/4/22 had bariatric surgery, repaired the hiatal hernia in Oct 2024    - tolerable discomfort   - current diet is liquid to advance to pureed today  - reduced appetite   - no nausea, vomiting  - normal BM     #) Mood is good and sleep- BETTER     # Leg swelling - not too bad after weight loss   - legs always hurt , this is chronic    #) intermittent HA- stable , always has one   - tired topamax in the past with minimal relief   - most days  - occasional neck pain, bilateral   - no emesis  - trying not to take any more NSAIDS  - never tried neurtec     #) Esophageal strictures, GERD and abdominal pain  prior to surgery - stable , stable no problems with swallowing, once after eating get nauseated -- better with repeated hernia surgery in October 2023.     #) lack of motivation without depression- stable , improved   - denies depression     #) HLD  - tried on atorvastatin 20 mg QHS  - causes myalgias so stopped taking it     #) Joint and muscle pain; dx of fibromyalgia  - stable   - legs are very painful  - sania when touched, more medial ankles and the top thighs   - never a smoker   - normal NELLY on 7/9/19  - maybe more of a lumbar stenosis, feels better with leaning forward \" positive shopping cart sign\"     #) low vitamin D  - on replacement and recheck with new blood work    #) Preventive:  Smoker: no  Etoh: no  Fam h/o: mother with CAD, DM and CA; father with Etoh use "   Vaccinations:  PAP: 2010, vaginal hysterectomy for uterine prolapse  Mammogram: 3/21/17 normal ; 4/23/18, 4/23/19-- ordered   Colonoscopy: needs, recommended f/u with Dr. Friedman 4/28/17 appt  DEXA: n/a- ordered   HepC screen: NR on 3/9/17  Fasting blood work: 3/9/17, 4/23/18  Last eye exam: needs  Last dental Exam: needs  Retired   4 children     Patient Care Team:  Dara Carpenter DO as PCP - General  Dara Carpenter DO as PCP - [facility] Attributed Provider     Review of Systems  ROS was completed and all systems are negative with the exception of what was noted in the the HPI.     Objective   Vitals:  /70   Pulse 98   Wt 64.9 kg (143 lb)   SpO2 96%   BMI 22.40 kg/m²       Physical Exam  GEN: A+O, no acute distress  HEENT: NC/AT, Oropharynx clear, no exudates, TM visualized, Extraoccular muscles intact, no facial droop; no thyromegaly or cervical LAD  RESP: CTAB, no wheezes   CV: RRR, no murmurs  ABD: soft, non-tender, + BS  SKIN: no rashes or bruising, no peripheral edema   NEURO: CN II-XII grossly intact, moves all extremities equally, no tremor   PSYCH: normal affect, appropriate mood     Assessment/Plan     Glad the hernia surgery was helpful     BP looks good today at 118/70     Mammogram on 10/19/23 was good , repeat yearly.     Get some updated labs prior to next appt.     Call if you need any medication refills today     Pleas also schedule an updated bone density screen     Monitor the tremor, see if stress makes it worse or a drink of alcohol makes it better- this is more likely essential tremor.     We did find the colonoscopy it was down in 2017. Repeat in 2027.     Follow up in 6 months for follow up  and Medicare Wellness or sooner as needed.

## 2024-04-26 ENCOUNTER — APPOINTMENT (OUTPATIENT)
Dept: PAIN MEDICINE | Facility: CLINIC | Age: 64
End: 2024-04-26
Payer: MEDICARE

## 2024-06-05 ENCOUNTER — APPOINTMENT (OUTPATIENT)
Dept: PAIN MEDICINE | Facility: CLINIC | Age: 64
End: 2024-06-05
Payer: COMMERCIAL

## 2024-06-24 ENCOUNTER — APPOINTMENT (OUTPATIENT)
Dept: PAIN MEDICINE | Facility: CLINIC | Age: 64
End: 2024-06-24
Payer: MEDICARE

## 2024-09-27 ENCOUNTER — APPOINTMENT (OUTPATIENT)
Dept: PRIMARY CARE | Facility: CLINIC | Age: 64
End: 2024-09-27
Payer: MEDICARE

## 2024-09-27 ENCOUNTER — LAB (OUTPATIENT)
Dept: LAB | Facility: LAB | Age: 64
End: 2024-09-27
Payer: MEDICARE

## 2024-09-27 VITALS
SYSTOLIC BLOOD PRESSURE: 116 MMHG | DIASTOLIC BLOOD PRESSURE: 74 MMHG | HEART RATE: 69 BPM | WEIGHT: 139 LBS | BODY MASS INDEX: 21.77 KG/M2 | OXYGEN SATURATION: 97 %

## 2024-09-27 DIAGNOSIS — Z12.31 ENCOUNTER FOR SCREENING MAMMOGRAM FOR MALIGNANT NEOPLASM OF BREAST: Primary | ICD-10-CM

## 2024-09-27 DIAGNOSIS — R53.82 CHRONIC FATIGUE AND MALAISE: ICD-10-CM

## 2024-09-27 DIAGNOSIS — Z13.820 ENCOUNTER FOR OSTEOPOROSIS SCREENING IN ASYMPTOMATIC POSTMENOPAUSAL PATIENT: ICD-10-CM

## 2024-09-27 DIAGNOSIS — E78.5 DYSLIPIDEMIA: ICD-10-CM

## 2024-09-27 DIAGNOSIS — R53.81 CHRONIC FATIGUE AND MALAISE: ICD-10-CM

## 2024-09-27 DIAGNOSIS — Z00.00 ROUTINE GENERAL MEDICAL EXAMINATION AT HEALTH CARE FACILITY: ICD-10-CM

## 2024-09-27 DIAGNOSIS — Z11.59 ENCOUNTER FOR HEPATITIS C SCREENING TEST FOR LOW RISK PATIENT: ICD-10-CM

## 2024-09-27 DIAGNOSIS — M19.90 ARTHRITIS: ICD-10-CM

## 2024-09-27 DIAGNOSIS — Z78.0 ENCOUNTER FOR OSTEOPOROSIS SCREENING IN ASYMPTOMATIC POSTMENOPAUSAL PATIENT: ICD-10-CM

## 2024-09-27 DIAGNOSIS — I73.9 PERIPHERAL VASCULAR DISEASE, ASYMPTOMATIC (CMS-HCC): ICD-10-CM

## 2024-09-27 DIAGNOSIS — H91.92 HEARING LOSS OF LEFT EAR, UNSPECIFIED HEARING LOSS TYPE: ICD-10-CM

## 2024-09-27 DIAGNOSIS — I10 PRIMARY HYPERTENSION: ICD-10-CM

## 2024-09-27 DIAGNOSIS — K44.9 HIATAL HERNIA: ICD-10-CM

## 2024-09-27 DIAGNOSIS — H90.5 SENSORINEURAL HEARING LOSS (SNHL) OF LEFT EAR, UNSPECIFIED HEARING STATUS ON CONTRALATERAL SIDE: ICD-10-CM

## 2024-09-27 PROBLEM — I20.89 ATYPICAL ANGINA (CMS-HCC): Status: RESOLVED | Noted: 2023-03-31 | Resolved: 2024-09-27

## 2024-09-27 PROBLEM — E66.01 MORBID (SEVERE) OBESITY DUE TO EXCESS CALORIES (MULTI): Status: RESOLVED | Noted: 2023-12-30 | Resolved: 2024-09-27

## 2024-09-27 LAB
ALBUMIN SERPL BCP-MCNC: 4.1 G/DL (ref 3.4–5)
ALP SERPL-CCNC: 80 U/L (ref 33–136)
ALT SERPL W P-5'-P-CCNC: 17 U/L (ref 7–45)
ANION GAP SERPL CALC-SCNC: 13 MMOL/L (ref 10–20)
AST SERPL W P-5'-P-CCNC: 17 U/L (ref 9–39)
BASOPHILS # BLD AUTO: 0.04 X10*3/UL (ref 0–0.1)
BASOPHILS NFR BLD AUTO: 0.9 %
BILIRUB SERPL-MCNC: 0.5 MG/DL (ref 0–1.2)
BUN SERPL-MCNC: 11 MG/DL (ref 6–23)
CALCIUM SERPL-MCNC: 9.2 MG/DL (ref 8.6–10.3)
CHLORIDE SERPL-SCNC: 104 MMOL/L (ref 98–107)
CHOLEST SERPL-MCNC: 174 MG/DL (ref 0–199)
CHOLESTEROL/HDL RATIO: 3
CO2 SERPL-SCNC: 29 MMOL/L (ref 21–32)
CREAT SERPL-MCNC: 0.61 MG/DL (ref 0.5–1.05)
EGFRCR SERPLBLD CKD-EPI 2021: >90 ML/MIN/1.73M*2
EOSINOPHIL # BLD AUTO: 0.05 X10*3/UL (ref 0–0.7)
EOSINOPHIL NFR BLD AUTO: 1.1 %
ERYTHROCYTE [DISTWIDTH] IN BLOOD BY AUTOMATED COUNT: 12.4 % (ref 11.5–14.5)
GLUCOSE SERPL-MCNC: 86 MG/DL (ref 74–99)
HCT VFR BLD AUTO: 38.9 % (ref 36–46)
HCV AB SER QL: NONREACTIVE
HDLC SERPL-MCNC: 58.9 MG/DL
HGB BLD-MCNC: 12.4 G/DL (ref 12–16)
IMM GRANULOCYTES # BLD AUTO: 0.01 X10*3/UL (ref 0–0.7)
IMM GRANULOCYTES NFR BLD AUTO: 0.2 % (ref 0–0.9)
LDLC SERPL CALC-MCNC: 104 MG/DL
LYMPHOCYTES # BLD AUTO: 1.89 X10*3/UL (ref 1.2–4.8)
LYMPHOCYTES NFR BLD AUTO: 42.8 %
MCH RBC QN AUTO: 32.6 PG (ref 26–34)
MCHC RBC AUTO-ENTMCNC: 31.9 G/DL (ref 32–36)
MCV RBC AUTO: 102 FL (ref 80–100)
MONOCYTES # BLD AUTO: 0.35 X10*3/UL (ref 0.1–1)
MONOCYTES NFR BLD AUTO: 7.9 %
NEUTROPHILS # BLD AUTO: 2.08 X10*3/UL (ref 1.2–7.7)
NEUTROPHILS NFR BLD AUTO: 47.1 %
NON HDL CHOLESTEROL: 115 MG/DL (ref 0–149)
NRBC BLD-RTO: 0 /100 WBCS (ref 0–0)
PLATELET # BLD AUTO: 239 X10*3/UL (ref 150–450)
POTASSIUM SERPL-SCNC: 3.9 MMOL/L (ref 3.5–5.3)
PROT SERPL-MCNC: 7.5 G/DL (ref 6.4–8.2)
RBC # BLD AUTO: 3.8 X10*6/UL (ref 4–5.2)
SODIUM SERPL-SCNC: 142 MMOL/L (ref 136–145)
TRIGL SERPL-MCNC: 57 MG/DL (ref 0–149)
TSH SERPL-ACNC: 1.23 MIU/L (ref 0.44–3.98)
VLDL: 11 MG/DL (ref 0–40)
WBC # BLD AUTO: 4.4 X10*3/UL (ref 4.4–11.3)

## 2024-09-27 PROCEDURE — 36415 COLL VENOUS BLD VENIPUNCTURE: CPT

## 2024-09-27 PROCEDURE — 86803 HEPATITIS C AB TEST: CPT

## 2024-09-27 PROCEDURE — 99214 OFFICE O/P EST MOD 30 MIN: CPT | Performed by: FAMILY MEDICINE

## 2024-09-27 PROCEDURE — 3078F DIAST BP <80 MM HG: CPT | Performed by: FAMILY MEDICINE

## 2024-09-27 PROCEDURE — 90656 IIV3 VACC NO PRSV 0.5 ML IM: CPT | Performed by: FAMILY MEDICINE

## 2024-09-27 PROCEDURE — 80053 COMPREHEN METABOLIC PANEL: CPT

## 2024-09-27 PROCEDURE — 85025 COMPLETE CBC W/AUTO DIFF WBC: CPT

## 2024-09-27 PROCEDURE — 1036F TOBACCO NON-USER: CPT | Performed by: FAMILY MEDICINE

## 2024-09-27 PROCEDURE — G0008 ADMIN INFLUENZA VIRUS VAC: HCPCS | Performed by: FAMILY MEDICINE

## 2024-09-27 PROCEDURE — 3074F SYST BP LT 130 MM HG: CPT | Performed by: FAMILY MEDICINE

## 2024-09-27 PROCEDURE — 82607 VITAMIN B-12: CPT

## 2024-09-27 PROCEDURE — 84443 ASSAY THYROID STIM HORMONE: CPT

## 2024-09-27 PROCEDURE — 82746 ASSAY OF FOLIC ACID SERUM: CPT

## 2024-09-27 PROCEDURE — 80061 LIPID PANEL: CPT

## 2024-09-27 RX ORDER — OMEPRAZOLE 40 MG/1
40 CAPSULE, DELAYED RELEASE ORAL
Qty: 30 CAPSULE | Refills: 3 | Status: SHIPPED | OUTPATIENT
Start: 2024-09-27 | End: 2025-01-25

## 2024-09-27 RX ORDER — ROSUVASTATIN CALCIUM 10 MG/1
10 TABLET, COATED ORAL DAILY
Qty: 90 TABLET | Refills: 3 | Status: SHIPPED | OUTPATIENT
Start: 2024-09-27 | End: 2025-09-27

## 2024-09-27 RX ORDER — LISINOPRIL 5 MG/1
5 TABLET ORAL DAILY
Qty: 90 TABLET | Refills: 3 | Status: SHIPPED | OUTPATIENT
Start: 2024-09-27 | End: 2025-09-27

## 2024-09-27 ASSESSMENT — ACTIVITIES OF DAILY LIVING (ADL)
DRESSING: INDEPENDENT
BATHING: INDEPENDENT
MANAGING_FINANCES: INDEPENDENT
GROCERY_SHOPPING: INDEPENDENT
TAKING_MEDICATION: INDEPENDENT
DOING_HOUSEWORK: INDEPENDENT

## 2024-09-27 NOTE — PROGRESS NOTES
"Subjective   Reason for Visit: Dara Abdullahi is an 63 y.o. female here for  6 month follow and MWA    Past Medical, Surgical, and Family History reviewed and updated in chart.  Reviewed all medications by prescribing practitioner or clinical pharmacist (such as prescriptions, OTCs, herbal therapies and supplements) and documented in the medical record.    On SS/disability since last visit   Pt had a hysterectomy, no need for PAP    HPI    #) obesity with comorbidities - had Bariatric surgery in Aug 2022.   - BMI 36--> 31--> 32 --> 22.4  --> 21.77  - START WEIGHT: 255  - CURRENT WEIGHT: 235--> 199--> 173 --> 148 --> 143 --> 139   - 8/4/22 had bariatric surgery, repaired the hiatal hernia in Oct 2024    - tolerable discomfort   - current diet is liquid to advance to pureed today  - reduced appetite   - no nausea, vomiting  - normal BM     #) Mood is good and sleep- BETTER     # Leg swelling - not too bad after weight loss   - legs always hurt , this is chronic    #) intermittent HA- stable , always has one   - tired topamax in the past with minimal relief   - most days  - occasional neck pain, bilateral   - no emesis  - trying not to take any more NSAIDS  - never tried neurtec     #) Esophageal strictures, GERD and abdominal pain  prior to surgery - stable , stable no problems with swallowing, once after eating get nauseated -- better with repeated hernia surgery in October 2023.     #) lack of motivation without depression- stable , improved   - denies depression     #) HLD  - tried on atorvastatin 20 mg QHS  - causes myalgias so stopped taking it     #) Joint and muscle pain; dx of fibromyalgia  - stable   - legs are very painful  - sania when touched, more medial ankles and the top thighs   - never a smoker   - normal NELLY on 7/9/19  - maybe more of a lumbar stenosis, feels better with leaning forward \" positive shopping cart sign\"     #) low vitamin D  - on replacement and recheck with new blood work    #) " Preventive:  Smoker: no  Etoh: no  Fam h/o: mother with CAD, DM and CA; father with Etoh use   Vaccinations:  PAP: 2010, vaginal hysterectomy for uterine prolapse  Mammogram: 3/21/17 normal ; 4/23/18, 4/23/19-- ordered   Colonoscopy: needs, recommended f/u with Dr. Friedman 4/28/17 appt  DEXA: n/a- ordered   HepC screen: NR on 3/9/17  Fasting blood work: 3/9/17, 4/23/18  Last eye exam: needs  Last dental Exam: needs  Retired   4 children     Patient Care Team:  Dara Carpenter DO as PCP - General  Dara Carpenter DO as PCP - [facility] Attributed Provider     Review of Systems  ROS was completed and all systems are negative with the exception of what was noted in the the HPI.     Objective   Vitals:  /74   Pulse 69   Wt 63 kg (139 lb)   SpO2 97%   BMI 21.77 kg/m²       Physical Exam  GEN: A+O, no acute distress  HEENT: NC/AT, Oropharynx clear, no exudates, TM visualized, Extraoccular muscles intact, no facial droop; no thyromegaly or cervical LAD  RESP: CTAB, no wheezes   CV: RRR, no murmurs  ABD: soft, non-tender, + BS  SKIN: no rashes or bruising, no peripheral edema   NEURO: CN II-XII grossly intact, moves all extremities equally, no tremor   PSYCH: normal affect, appropriate mood     Assessment/Plan     Continue to work on getting enough protein in the diet.     Try to incorporate some weight exercise    BP looks good today at 116/74.    Mammogram on 10/19/23 was good , repeat yearly.  New order placed    Get some updated labs prior to next appt. Go down today since you are fasting.     We refilled all medications today     Flu shot today     Pleas also schedule an updated bone density screen - new order placed     Continue regular eye and dental examination.     Continue to monitor the left ear hearing loss, order for a follow up hearing test ordered     Monitor the tremor, see if stress makes it worse or a drink of alcohol makes it better- this is more likely essential tremor.     We did find the  colonoscopy it was down in 2017. Repeat in 2027.     Follow up in 6 months for follow up or sooner as needed.

## 2024-09-27 NOTE — PATIENT INSTRUCTIONS
Continue to work on getting enough protein in the diet.     Try to incorporate some weight exercise    BP looks good today at 116/74.    Mammogram on 10/19/23 was good , repeat yearly.  New order placed    Get some updated labs prior to next appt. Go down today since you are fasting.     We refilled all medications today     Flu shot today     Pleas also schedule an updated bone density screen - new order placed     Continue regular eye and dental examination.     Continue to monitor the left ear hearing loss, order for a follow up hearing test ordered     Monitor the tremor, see if stress makes it worse or a drink of alcohol makes it better- this is more likely essential tremor.     We did find the colonoscopy it was down in 2017. Repeat in 2027.     Follow up in 6 months for follow up or sooner as needed.

## 2024-10-02 DIAGNOSIS — R79.89 ABNORMAL CBC MEASUREMENT: Primary | ICD-10-CM

## 2024-10-02 LAB
FOLATE SERPL-MCNC: 15.2 NG/ML
VIT B12 SERPL-MCNC: 354 PG/ML (ref 211–911)

## 2024-10-18 ENCOUNTER — HOSPITAL ENCOUNTER (OUTPATIENT)
Dept: RADIOLOGY | Facility: HOSPITAL | Age: 64
Discharge: HOME | End: 2024-10-18
Payer: MEDICARE

## 2024-10-18 DIAGNOSIS — Z13.820 ENCOUNTER FOR OSTEOPOROSIS SCREENING IN ASYMPTOMATIC POSTMENOPAUSAL PATIENT: ICD-10-CM

## 2024-10-18 DIAGNOSIS — Z78.0 ENCOUNTER FOR OSTEOPOROSIS SCREENING IN ASYMPTOMATIC POSTMENOPAUSAL PATIENT: ICD-10-CM

## 2024-10-18 PROCEDURE — 77080 DXA BONE DENSITY AXIAL: CPT

## 2024-10-21 ENCOUNTER — HOSPITAL ENCOUNTER (OUTPATIENT)
Dept: RADIOLOGY | Facility: HOSPITAL | Age: 64
Discharge: HOME | End: 2024-10-21
Payer: MEDICARE

## 2024-10-21 VITALS — BODY MASS INDEX: 21.97 KG/M2 | WEIGHT: 140 LBS | HEIGHT: 67 IN

## 2024-10-21 DIAGNOSIS — Z12.31 ENCOUNTER FOR SCREENING MAMMOGRAM FOR MALIGNANT NEOPLASM OF BREAST: ICD-10-CM

## 2024-10-21 PROCEDURE — 77067 SCR MAMMO BI INCL CAD: CPT | Performed by: RADIOLOGY

## 2024-10-21 PROCEDURE — 77063 BREAST TOMOSYNTHESIS BI: CPT | Performed by: RADIOLOGY

## 2024-10-21 PROCEDURE — 77067 SCR MAMMO BI INCL CAD: CPT

## 2025-03-28 ENCOUNTER — APPOINTMENT (OUTPATIENT)
Dept: PRIMARY CARE | Facility: CLINIC | Age: 65
End: 2025-03-28
Payer: MEDICARE

## 2025-04-23 ENCOUNTER — APPOINTMENT (OUTPATIENT)
Dept: PRIMARY CARE | Facility: CLINIC | Age: 65
End: 2025-04-23
Payer: MEDICARE

## 2025-05-16 ENCOUNTER — APPOINTMENT (OUTPATIENT)
Dept: PRIMARY CARE | Facility: CLINIC | Age: 65
End: 2025-05-16
Payer: MEDICARE

## 2025-05-30 ENCOUNTER — APPOINTMENT (OUTPATIENT)
Dept: PRIMARY CARE | Facility: CLINIC | Age: 65
End: 2025-05-30
Payer: MEDICARE

## 2025-06-10 ENCOUNTER — APPOINTMENT (OUTPATIENT)
Dept: PRIMARY CARE | Facility: CLINIC | Age: 65
End: 2025-06-10
Payer: MEDICARE

## 2025-06-10 VITALS
SYSTOLIC BLOOD PRESSURE: 122 MMHG | OXYGEN SATURATION: 100 % | DIASTOLIC BLOOD PRESSURE: 78 MMHG | BODY MASS INDEX: 21.46 KG/M2 | HEART RATE: 61 BPM | WEIGHT: 137 LBS

## 2025-06-10 DIAGNOSIS — E78.5 DYSLIPIDEMIA: ICD-10-CM

## 2025-06-10 DIAGNOSIS — R53.82 CHRONIC FATIGUE AND MALAISE: ICD-10-CM

## 2025-06-10 DIAGNOSIS — Z12.31 ENCOUNTER FOR SCREENING MAMMOGRAM FOR MALIGNANT NEOPLASM OF BREAST: ICD-10-CM

## 2025-06-10 DIAGNOSIS — R53.81 CHRONIC FATIGUE AND MALAISE: ICD-10-CM

## 2025-06-10 DIAGNOSIS — K29.70 GASTRITIS WITHOUT BLEEDING, UNSPECIFIED CHRONICITY, UNSPECIFIED GASTRITIS TYPE: ICD-10-CM

## 2025-06-10 DIAGNOSIS — R79.89 ABNORMAL CBC MEASUREMENT: ICD-10-CM

## 2025-06-10 DIAGNOSIS — Z11.59 ENCOUNTER FOR HEPATITIS C SCREENING TEST FOR LOW RISK PATIENT: ICD-10-CM

## 2025-06-10 DIAGNOSIS — M19.90 ARTHRITIS: ICD-10-CM

## 2025-06-10 DIAGNOSIS — R00.2 PALPITATIONS: Primary | ICD-10-CM

## 2025-06-10 PROCEDURE — 3078F DIAST BP <80 MM HG: CPT | Performed by: FAMILY MEDICINE

## 2025-06-10 PROCEDURE — 99214 OFFICE O/P EST MOD 30 MIN: CPT | Performed by: FAMILY MEDICINE

## 2025-06-10 PROCEDURE — 1159F MED LIST DOCD IN RCRD: CPT | Performed by: FAMILY MEDICINE

## 2025-06-10 PROCEDURE — G2211 COMPLEX E/M VISIT ADD ON: HCPCS | Performed by: FAMILY MEDICINE

## 2025-06-10 PROCEDURE — 1036F TOBACCO NON-USER: CPT | Performed by: FAMILY MEDICINE

## 2025-06-10 PROCEDURE — 3074F SYST BP LT 130 MM HG: CPT | Performed by: FAMILY MEDICINE

## 2025-06-10 RX ORDER — SUCRALFATE 1 G/1
1 TABLET ORAL
Qty: 120 TABLET | Refills: 11 | Status: SHIPPED | OUTPATIENT
Start: 2025-06-10 | End: 2026-06-10

## 2025-06-10 ASSESSMENT — PATIENT HEALTH QUESTIONNAIRE - PHQ9
SUM OF ALL RESPONSES TO PHQ9 QUESTIONS 1 AND 2: 0
2. FEELING DOWN, DEPRESSED OR HOPELESS: NOT AT ALL
1. LITTLE INTEREST OR PLEASURE IN DOING THINGS: NOT AT ALL

## 2025-06-10 NOTE — PROGRESS NOTES
"Subjective   Reason for Visit: Dara Abdullahi is an 64 y.o. female here for  6 month follow and MWA       Reviewed all medications by prescribing practitioner or clinical pharmacist (such as prescriptions, OTCs, herbal therapies and supplements) and documented in the medical record.      HPI    #) dyspnea in the shower  - no cough or wheeze  - no   - Tachycardia/palpitations once daily that sometimes causes a cough  - will get a holter monitor     #) obesity with comorbidities - had Bariatric surgery in Aug 2022.   - BMI 36--> 31--> 32 --> 22.4  --> 21.77--> 21.46   - START WEIGHT: 255  - CURRENT WEIGHT: 235--> 199--> 173 --> 148 --> 143 --> 139 --> 137   - 8/4/22 had bariatric surgery, repaired the hiatal hernia in Oct 2024    - tolerable discomfort   - current diet is liquid to advance to pureed today  - reduced appetite   - no nausea, vomiting  - normal BM     #) Mood is good and sleep- BETTER     # Leg swelling - not too bad after weight loss   - legs always hurt , this is chronic    #) intermittent HA- stable , always has one   - tired topamax in the past with minimal relief   - most days  - occasional neck pain, bilateral   - no emesis  - trying not to take any more NSAIDS  - never tried neurtec     #) Esophageal strictures, GERD and abdominal pain  prior to surgery - stable , stable no problems with swallowing, once after eating get nauseated -- better with repeated hernia surgery in October 2023.     #) lack of motivation without depression- stable , improved   - denies depression     #) HLD  - tried on atorvastatin 20 mg QHS  - causes myalgias so stopped taking it     #) Joint and muscle pain; dx of fibromyalgia  - stable   - legs are very painful  - sania when touched, more medial ankles and the top thighs   - never a smoker   - normal NELLY on 7/9/19  - maybe more of a lumbar stenosis, feels better with leaning forward \" positive shopping cart sign\"     #) low vitamin D  - on replacement and recheck with " new blood work    #) Preventive:  Smoker: no  Etoh: no  Fam h/o: mother with CAD, DM and CA; father with Etoh use   Vaccinations:  PAP: 2010, vaginal hysterectomy for uterine prolapse  Mammogram: 3/21/17 normal ; 4/23/18, 4/23/19-- ordered   Colonoscopy: needs, recommended f/u with Dr. Friedman 4/28/17 appt  DEXA: n/a- ordered   HepC screen: NR on 3/9/17  Fasting blood work: 3/9/17, 4/23/18  Last eye exam: needs  Last dental Exam: needs  Retired   4 children     Patient Care Team:  Dara Carpenter DO as PCP - General (Family Medicine)  Dara Carpenter DO as PCP - [facility] Attributed Provider     Review of Systems  ROS was completed and all systems are negative with the exception of what was noted in the the HPI.     Objective   Vitals:  /78   Pulse 61   Wt 62.1 kg (137 lb)   SpO2 100%   BMI 21.46 kg/m²       Physical Exam  GEN: A+O, no acute distress  HEENT: NC/AT, Oropharynx clear, no exudates, TM visualized, Extraoccular muscles intact, no facial droop; no thyromegaly or cervical LAD  RESP: CTAB, no wheezes   CV: RRR, no murmurs  ABD: soft, non-tender, + BS  SKIN: no rashes or bruising, no peripheral edema   NEURO: CN II-XII grossly intact, moves all extremities equally, no tremor   PSYCH: normal affect, appropriate mood     Assessment/Plan     Continue to work on getting enough protein in the diet.     Try the sucralfate prior to meals tos to see if helps the nausea with eating     Try to incorporate some weight exercise,     BP looks good today at 122/78     Mammogram due again in October 2025     Get some updated labs in September 2025     DEXA on 10/18/24 was normal    Continue regular eye and dental examination.     Continue to monitor the left ear hearing loss, order for a follow up hearing test ordered     Please follow up with the bariatric team for the nausea     Monitor the tremor, see if stress makes it worse or a drink of alcohol makes it better- this is more likely essential tremor.     No  longer need a PAP due to hysterectomy     We did find the colonoscopy it was down in 2017. Repeat in 2027.     Follow up in 6 months for medicare wellness or sooner as needed.

## 2025-06-13 ENCOUNTER — HOSPITAL ENCOUNTER (OUTPATIENT)
Dept: CARDIOLOGY | Facility: HOSPITAL | Age: 65
Discharge: HOME | End: 2025-06-13
Payer: MEDICARE

## 2025-06-13 DIAGNOSIS — R00.2 PALPITATIONS: ICD-10-CM

## 2025-06-13 PROCEDURE — 93246 EXT ECG>7D<15D RECORDING: CPT

## 2025-07-16 NOTE — PROGRESS NOTES
"GENERAL SURGERY WITH HISTORY OF WEIGHT LOSS SURGERY  CLINIC  POST-OP FOLLOW UP VISIT  CLINIC DATE: 7/28/25    NAME:  Dara Abdullahi, 64 y.o.  MRN: [id number]    DATE OF SURGERY:  10/20/2023  SURGICAL PROCEDURE:  Lap Recurrent HHR [facility] [employer] Semi-circumferential  DATE OF SURGERY:  8/4/2022  SURGICAL PROCEDURE:  Lap RYGBP HHR  COMPLICATIONS DURING ADMISSION: None    THIS VISIT WEIGHT / BMI: 135 LBS/ BMI: 21.14  Wt Readings from Last 1 Encounters:   06/10/25 62.1 kg (137 lb)      BMI Readings from Last 1 Encounters:   06/10/25 21.46 kg/m²     WEIGHT / BMI HX:    Wt Readings from Last 3 Encounters:   06/10/25 62.1 kg (137 lb)   10/21/24 63.5 kg (140 lb)   09/27/24 63 kg (139 lb)      BMI Readings from Last 3 Encounters:   06/10/25 21.46 kg/m²   10/21/24 21.93 kg/m²   09/27/24 21.77 kg/m²       SURGEON:  Dr. Iman Mitchell    PRESENTING TODAY FOR General Surgery Post-Op FUV.  This LPN spoke with a 64 y.o. female on 7/25/25. Time Since Surgery: 1 yr 7 mos Recurrent HHR & 2 yrs 11 mos RYGBP w/HHR.  Annual Labs minus recent PCP lab orders entered.    Last hh repair in 2023 and 3 years since the bypass.  Notes everything is making her sick and llost over 129 lbs.  Just thinking about food.  No smoking/vaping.  No alcohol. Only uses aleve.  No nsaid's.   Notes the whole time hard to eat but feels like it is getting worse.    Yesterday had soup.  Tried cream of wheat and did not work. Tries eggs as sometimes it works.  Can get to 3 bites maybe. Still on omeprazole and sucralfate.  Takes a whole omeprazole 40 mg. Did no like the carafate slurry but sometimes sticks. Taking this 3-4 times/day.  Moves bowels fine.  Occ diarrhea.  Routine labs last September ok.  Last hh repair in October of 2023    CURRENT SYMPTOMS:      Abdominal Pain: No  \"more mid chest in between my ribs\"    Bloating: No    Burping: No    Constipation: No    Diarrhea: Yes    Dumping Syndrome: No  \"but I can't eat anything, everything makes me sick and " "makes it hurt\"    Hunger: Yes    Food Intolerances: Yes  \"absolutely everything\"    Hiccups: No    Nausea: Yes    Vomiting: Yes     EATING HABITS HISTORY:       Carbonated Beverages: No          Caffeinated Beverages: Yes    Diet Stage:  Regular Foods    Time To Eat Meals 30 Minutes: No    Fluid intake 60-64 oz/day: Yes      Protein Intake 60 grams/day: No  \"more like 20 probably I haven't been tracking it\"    Breakfast: \"I attempted to have scrambled egs with ham and monge but I only had like 3 bites\"    Lunch: Skipped as of 3:07 PM 7/25/25    Dinner: \"I had a couple chicken quesadilla about a quarter, some times if I take a couple bites then let it sit for a while then I can eat more\"    Snacks: \"well I mean I'll have crackers (threw all that up a couple days ago), chips, cookies\"    GERD - Health Related Quality of Life Questionnaire (GERD- HRQL)  On PPI  Carafate QID & Omeprazole 40 mg daily    How bad is the heartburn? 0 = No symptoms  Heartburn when lying down? 0 = No symptoms  Heartburn when standing up? 0 = No symptoms  Heartburn after meals? 0 = No symptoms  Does heartburn change your diet? 0 = No symptoms  Does heartburn wake you from sleep? 0 = No symptoms    Do you have difficulty swallowing? 2 = Symptoms noticeable and bothersome but not every day  Do you have pain with swallowing? 0 = No symptoms  If you take medication, does this affect your daily life? 1 = Symptoms noticeable but not bothersome    How bad is the regurgitation? 2 = Symptoms noticeable and bothersome but not every day  \"into my mouth\"  Regurgitation when lying down? 0 = No symptoms  Regurgitation when standing up? 2 = Symptoms noticeable and bothersome but not every day  \"Usually just sitting\"  Regurgitation after meals? 2 = Symptoms noticeable and bothersome but not every day  \"could be several hours later\"  Does regurgitation change your diet? 2 = Symptoms noticeable and bothersome but not every day  Does regurgitation wake you from " "sleep? 0 = No symptoms    How satisfied are you with your present condition? Dissatisfied    Total score (calculated by summing the individual scores of questions 1-15): 11   Greatest possible score 75 (worst symptoms).   Lowest possible score 0 (no symptoms).    Heartburn score (calculated by summing the individual scores of questions 1-6): 0   Worst heartburn symptoms: 30.   No heartburn symptoms: 0.   Score less than or equal to 12 with each individual question not exceeding 2 indicate heartburn elimination.    Regurgitation score (calculated by summing the individual scores of questions 10-15): 8   Worst regurgitation symptoms: 30.   No regurgitation symptoms: 0.   Score less than or equal to 12 with each individual question not exceeding 2 indicate regurgitation elimination.     EXERCISE:  Yes  \"just working outside & I do walk\"      OTHER PROVIDER LAST IMPRESSION VISIT NOTE:  Subjective  Reason for Visit: Dara Abdullahi is an 64 y.o. female here for  6 month follow and MWA     Reviewed all medications by prescribing practitioner or clinical pharmacist (such as prescriptions, OTCs, herbal therapies and supplements) and documented in the medical record.        HPI     #) dyspnea in the shower  - no cough or wheeze  - no   - Tachycardia/palpitations once daily that sometimes causes a cough  - will get a holter monitor      #) obesity with comorbidities - had Bariatric surgery in Aug 2022.   - BMI 36--> 31--> 32 --> 22.4  --> 21.77--> 21.46   - START WEIGHT: 255  - CURRENT WEIGHT: 235--> 199--> 173 --> 148 --> 143 --> 139 --> 137   - 8/4/22 had bariatric surgery, repaired the hiatal hernia in Oct 2024    - tolerable discomfort   - current diet is liquid to advance to pureed today  - reduced appetite   - no nausea, vomiting  - normal BM      #) Mood is good and sleep- BETTER      # Leg swelling - not too bad after weight loss   - legs always hurt , this is chronic     #) intermittent HA- stable , always has one " "  - tired topamax in the past with minimal relief   - most days  - occasional neck pain, bilateral   - no emesis  - trying not to take any more NSAIDS  - never tried neurtec      #) Esophageal strictures, GERD and abdominal pain  prior to surgery - stable , stable no problems with swallowing, once after eating get nauseated -- better with repeated hernia surgery in October 2023.      #) lack of motivation without depression- stable , improved   - denies depression      #) HLD  - tried on atorvastatin 20 mg QHS  - causes myalgias so stopped taking it      #) Joint and muscle pain; dx of fibromyalgia  - stable   - legs are very painful  - sania when touched, more medial ankles and the top thighs   - never a smoker   - normal NELLY on 7/9/19  - maybe more of a lumbar stenosis, feels better with leaning forward \" positive shopping cart sign\"      #) low vitamin D  - on replacement and recheck with new blood work     #) Preventive:  Smoker: no  Etoh: no  Fam h/o: mother with CAD, DM and CA; father with Etoh use   Vaccinations:  PAP: 2010, vaginal hysterectomy for uterine prolapse  Mammogram: 3/21/17 normal ; 4/23/18, 4/23/19-- ordered   Colonoscopy: needs, recommended f/u with Dr. Friedman 4/28/17 appt  DEXA: n/a- ordered   HepC screen: NR on 3/9/17  Fasting blood work: 3/9/17, 4/23/18  Last eye exam: needs  Last dental Exam: needs  Retired   4 children           Assessment/Plan  Continue to work on getting enough protein in the diet.      Try the sucralfate prior to meals tos to see if helps the nausea with eating      Try to incorporate some weight exercise,      BP looks good today at 122/78      Mammogram due again in October 2025      Get some updated labs in September 2025      DEXA on 10/18/24 was normal     Continue regular eye and dental examination.      Continue to monitor the left ear hearing loss, order for a follow up hearing test ordered      Please follow up with the bariatric team for the nausea      Monitor " the tremor, see if stress makes it worse or a drink of alcohol makes it better- this is more likely essential tremor.      No longer need a PAP due to hysterectomy      We did find the colonoscopy it was down in 2017. Repeat in 2027.      Follow up in 6 months for medicare wellness or sooner as needed.             Electronically signed by Dara Carpenter DO at 6/10/2025  5:11 PM      PROVIDER LAST IMPRESSION VISIT NOTE:         Diagnoses/Problems  Assessed    · Nausea in adult (787.02) (R11.0)   · Abdominal pain (789.00) (R10.9)   · Hiatal hernia with GERD (553.3,530.81) (K44.9,K21.9)   · S/P gastric bypass (V45.86) (Z98.84)     Patient Discussion/Summary     Plan:   Instructions / Recommendations:   We will plan to refix the hiatal hernia.  Continue with good lifestyle.      Provider Impressions     62F hx of lap bypass and hiatal hernia repair, BMI 39 in Aug 2022 now down to 26 who is complaining of nausea and pain after every meal.We have worked with her on lifestyle modification and she has been very compliant. She continue to have symptoms with nausea and pain. EGD shows no ulcer, only a HH of 2-3 cm which is half of her pouch. HH repair done at time of bypass but she has lost significant weight since then. We will do a redo HH repair as the recurrence is likely due to the weight loss. Risks and benefists discussed at length. All quesitons were answered.      Chief Complaint     The patient is being seen for follow up.   The patient is being seen today 11 months. Type of surgery: Revision: w/HHR~. Surgery date: 08/04/2022.      63 yo female with BMI: presenting for 1 yr Bariatric FUV - S/P: RYGB w/HHR 8/4/22.      History of Present Illness  Type of Surgery: Lap RYGB w/HHR,~surgery Date: 08/04/2022.   Weight:.   Initial weight: 255 lbs.~   The patient's weight was 245 lbs at pre-op visit.          CURRENT MEDICATIONS:  Current Medications[1]    PAST MEDICAL HISTORY:  Problem List[2]    PAST SURGICAL  HISTORY:  Surgical History[3]    FAMILY HISTORY:  Family History[4]    SOCIAL HISTORY:  Social History     Socioeconomic History    Marital status:      Spouse name: Not on file    Number of children: Not on file    Years of education: Not on file    Highest education level: Not on file   Occupational History    Not on file   Tobacco Use    Smoking status: Never    Smokeless tobacco: Never   Vaping Use    Vaping status: Never Used   Substance and Sexual Activity    Alcohol use: Never    Drug use: Never    Sexual activity: Not on file   Other Topics Concern    Not on file   Social History Narrative    Not on file     Social Drivers of Health     Financial Resource Strain: Low Risk  (10/21/2023)    Overall Financial Resource Strain (CARDIA)     Difficulty of Paying Living Expenses: Not hard at all   Food Insecurity: Not on file   Transportation Needs: No Transportation Needs (10/21/2023)    PRAPARE - Transportation     Lack of Transportation (Medical): No     Lack of Transportation (Non-Medical): No   Physical Activity: Not on file   Stress: Not on file   Social Connections: Not on file   Intimate Partner Violence: Not on file   Housing Stability: Low Risk  (10/21/2023)    Housing Stability Vital Sign     Unable to Pay for Housing in the Last Year: No     Number of Places Lived in the Last Year: 1     Unstable Housing in the Last Year: No       ALLERGIES:  RX Allergies[5]    REVIEW OF SYSTEMS:  CONSTITUTIONAL: Patient denies fevers, chills, sweats and weight changes.  EYES: Patient denies any visual symptoms.  EARS, NOSE, AND THROAT: No difficulties with hearing. No symptoms of rhinitis or sore throat.  CARDIOVASCULAR: Patient denies chest pains, palpitations, orthopnea and paroxysmal nocturnal dyspnea.  RESPIRATORY: No dyspnea on exertion, no wheezing or cough.  GI: No nausea, vomiting, diarrhea, constipation, abdominal pain, hematochezia or melena.  : No urinary hesitancy or dribbling. No nocturia or  urinary frequency. No abnormal urethral discharge.  MUSCULOSKELETAL: No myalgias or arthralgias.  NEUROLOGIC: No chronic headaches, no seizures. Patient denies numbness, tingling or weakness.  PSYCHIATRIC: Patient denies problems with mood disturbance. No problems with anxiety.  ENDOCRINE: No excessive urination or excessive thirst.  DERMATOLOGIC: Patient denies any rashes or skin changes.    PHYSICAL EXAM  There were no vitals taken for this visit.  PHYSICAL EXAMINATION:  GENERAL: No apparent distress. Pt is alert and oriented x3.  VITAL SIGNS: HR, BP, Temp; Normal  HEENT: Head is normocephalic and atraumatic. Extraocular muscles are intact. Pupils are equal, round, and reactive to light and accommodation. Nares appeared normal. Mouth is well hydrated and without lesions. Mucous membranes are moist. Posterior pharynx clear of any exudate or lesions.  NECK: Supple. No carotid bruits. No lymphadenopathy or thyromegaly.  LUNGS: Clear to auscultation.  HEART: Regular rate and rhythm without murmur.  ABDOMEN: Soft, appropriately mildly tender to palpation, and nondistended. Positive bowel sounds. No hepatosplenomegaly was noted. Incisions CDI.  EXTREMITIES: Without any cyanosis, clubbing, rash, lesions or edema.  NEUROLOGIC: Cranial nerves II through XII are grossly intact.  PSYCHIATRIC: Flat affect, but denies suicidal or homicidal ideations.  SKIN: No ulceration or induration present.      IMPRESSION: Dara Abdullahi IS A 64 y.o. female post bypass 9n 2022 and repair of HH in 10/23.  She has had some issues not tolerating food and is down over 120 lbs.  She notes can hardly eat anything,  Notes donna discomfort and pain in DONNA.  Will have her open capsule of the omeprazole and stay on carafate.  Will do egd and dilate if needed. Get ugi study  Follow up after studies complete.     PLAN:  Open the capsule of the omepraole and mix with apple sauce and take before breakfast  Continue the carafate before the meals.  We  will do an endosocpy with possible dilation  We will get an upper gi to evaluate for the dysphagia    Dr. Iman Mitchell M.D., MPH  Director of Bariatric & Minimally Invasive Surgery  [place] [address]  T:  [phone]  F:  [phone]     Antonette Lazcano RN, BSN - Bariatric Clinical Nurse Coordinator - [facility]  T:  [phone]    F:  [phone]          [1]   Current Outpatient Medications   Medication Sig Dispense Refill    cyanocobalamin (Vitamin B-12) 50 mcg tablet Take by mouth once daily. Pt unsure of doose      ergocalciferol, vitamin D2, 62.5 mcg (2,500 unit) capsule Take 2 capsules (125 mcg) by mouth once daily.      lisinopril 5 mg tablet Take 1 tablet (5 mg) by mouth once daily. 90 tablet 3    multivitamin tablet Take 1 tablet by mouth once daily.      omeprazole (PriLOSEC) 40 mg DR capsule Take 1 capsule (40 mg) by mouth once daily in the morning. Take before meals. Please remove capsule and use granules and mix with sugar free pudding or jello 30 capsule 3    rosuvastatin (Crestor) 10 mg tablet Take 1 tablet (10 mg) by mouth once daily. 90 tablet 3    sucralfate (Carafate) 1 gram tablet Take 1 tablet (1 g) by mouth 4 times a day before meals. 120 tablet 11     No current facility-administered medications for this visit.   [2]   Patient Active Problem List  Diagnosis    Arthritis    Chronic daily headache    Chronic fatigue and malaise    Daytime sleepiness    Cervical radiculitis    Fibromyalgia    Chronic low back pain    HTN (hypertension)    Left shoulder pain    Peripheral edema    Vitamin D deficiency    Sciatica of right side associated with disorder of lumbar spine    Schatzki's ring    Restless leg    Proximal muscle weakness    Dyslipidemia    Peripheral vascular disease, asymptomatic    Hiatal hernia    Dysphagia   [3]   Past Surgical History:  Procedure Laterality Date    ESOPHAGOGASTRODUODENOSCOPY  07/29/2022    3 cm HH, few gastric  polyps w/Dr. CÉSAR Mitchell    ESOPHAGOGASTRODUODENOSCOPY  05/23/2023    2 cm HH, Intact Gastric Bypass w/Dr. CÉSAR Mitchell    FOOT SURGERY  03/07/2017    Foot Surgery    HYSTERECTOMY  03/07/2017    Hysterectomy    INNER EAR SURGERY  03/07/2017    Inner Ear Surgery    OTHER SURGICAL HISTORY  10/05/2021    Varicose vein ligation    TUBAL LIGATION  03/07/2017    Tubal Ligation   [4]   Family History  Problem Relation Name Age of Onset    Other (cardiac disorder) Mother      Diabetes Mother      Pancreatic cancer Mother      Diabetes type II Mother          without complications    Cancer Mother      Breast cancer Mother      Alcohol abuse Father      Other (carbon monoxide poisoning) Father      Alzheimer's disease Father      Lung cancer Brother     [5] No Known Allergies

## 2025-07-25 ENCOUNTER — TELEPHONE (OUTPATIENT)
Dept: SURGERY | Facility: CLINIC | Age: 65
End: 2025-07-25
Payer: MEDICARE

## 2025-07-25 NOTE — TELEPHONE ENCOUNTER
Thank you for speaking with me earlier today & confirming your scheduled visit with Dr. Mitchell on 7/28/25 2:15 PM at [facility] [address] ([address]) [place] [address] (inside [facility], main floor in the [facility]).   Parking is available at a cost of around $5.00 cash at the Main Entrance.  Clinic exam rooms run warm to cool, please dress in layers for your comfort.  We look forward to seeing you, Nadya Eugene LPN Clinic Nurse.

## 2025-07-28 ENCOUNTER — APPOINTMENT (OUTPATIENT)
Dept: SURGERY | Facility: CLINIC | Age: 65
End: 2025-07-28
Payer: MEDICARE

## 2025-07-28 VITALS
HEIGHT: 67 IN | WEIGHT: 135 LBS | HEART RATE: 59 BPM | BODY MASS INDEX: 21.19 KG/M2 | OXYGEN SATURATION: 96 % | DIASTOLIC BLOOD PRESSURE: 75 MMHG | SYSTOLIC BLOOD PRESSURE: 158 MMHG

## 2025-07-28 DIAGNOSIS — E78.5 DYSLIPIDEMIA: ICD-10-CM

## 2025-07-28 DIAGNOSIS — K22.2 SCHATZKI'S RING: ICD-10-CM

## 2025-07-28 DIAGNOSIS — Z98.84 BARIATRIC SURGERY STATUS: ICD-10-CM

## 2025-07-28 DIAGNOSIS — R12 HEARTBURN: ICD-10-CM

## 2025-07-28 DIAGNOSIS — E43 UNSPECIFIED SEVERE PROTEIN-CALORIE MALNUTRITION (MULTI): ICD-10-CM

## 2025-07-28 DIAGNOSIS — Z98.84 STATUS POST BARIATRIC SURGERY: ICD-10-CM

## 2025-07-28 DIAGNOSIS — K44.9 HIATAL HERNIA: ICD-10-CM

## 2025-07-28 DIAGNOSIS — M79.7 FIBROMYALGIA: ICD-10-CM

## 2025-07-28 DIAGNOSIS — I10 HYPERTENSION, UNSPECIFIED TYPE: Primary | ICD-10-CM

## 2025-07-28 DIAGNOSIS — K21.00 GASTROESOPHAGEAL REFLUX DISEASE WITH ESOPHAGITIS, UNSPECIFIED WHETHER HEMORRHAGE: ICD-10-CM

## 2025-07-28 DIAGNOSIS — Z13.21 ENCOUNTER FOR VITAMIN DEFICIENCY SCREENING: ICD-10-CM

## 2025-07-28 DIAGNOSIS — Z01.818 PREOPERATIVE CLEARANCE: ICD-10-CM

## 2025-07-28 NOTE — PATIENT INSTRUCTIONS
PLAN:  Open the capsule of the omepraole and mix with apple sauce and take before breakfast  Continue the carafate before the meals.  We will do an endosocpy with possible dilation  We will get an upper gi to evaluate for the dysphagia    Dr. Iman Mitchell M.D., MPH  Director of Bariatric & Minimally Invasive Surgery  [place] [address]  T:  [phone]  F:  [phone]     Antonette Lazcano RN, BSN - Bariatric Clinical Nurse Coordinator - [facility] Office  T:  [phone]    F:  [phone]

## 2025-08-11 ENCOUNTER — HOSPITAL ENCOUNTER (OUTPATIENT)
Dept: RADIOLOGY | Facility: HOSPITAL | Age: 65
Discharge: HOME | End: 2025-08-11
Payer: MEDICARE

## 2025-08-11 DIAGNOSIS — K22.2 SCHATZKI'S RING: ICD-10-CM

## 2025-08-11 DIAGNOSIS — K21.00 GASTROESOPHAGEAL REFLUX DISEASE WITH ESOPHAGITIS, UNSPECIFIED WHETHER HEMORRHAGE: ICD-10-CM

## 2025-08-11 DIAGNOSIS — K44.9 HIATAL HERNIA: ICD-10-CM

## 2025-08-11 DIAGNOSIS — R12 HEARTBURN: ICD-10-CM

## 2025-08-11 PROCEDURE — 2500000005 HC RX 250 GENERAL PHARMACY W/O HCPCS: Performed by: SURGERY

## 2025-08-11 PROCEDURE — A9698 NON-RAD CONTRAST MATERIALNOC: HCPCS | Performed by: SURGERY

## 2025-08-11 PROCEDURE — 2500000001 HC RX 250 WO HCPCS SELF ADMINISTERED DRUGS (ALT 637 FOR MEDICARE OP): Performed by: SURGERY

## 2025-08-11 PROCEDURE — 74246 X-RAY XM UPR GI TRC 2CNTRST: CPT

## 2025-08-11 PROCEDURE — 74246 X-RAY XM UPR GI TRC 2CNTRST: CPT | Performed by: STUDENT IN AN ORGANIZED HEALTH CARE EDUCATION/TRAINING PROGRAM

## 2025-08-11 RX ADMIN — BARIUM SULFATE 700 MG: 700 TABLET ORAL at 11:31

## 2025-08-11 RX ADMIN — ANTACID/ANTIFLATULENT 1 PACKET: 380; 550; 10; 10 GRANULE, EFFERVESCENT ORAL at 11:30

## 2025-08-11 RX ADMIN — BARIUM SULFATE 40 ML: 980 POWDER, FOR SUSPENSION ORAL at 11:30

## 2025-08-11 RX ADMIN — BARIUM SULFATE 110 ML: 0.6 SUSPENSION ORAL at 11:30

## 2025-08-22 ENCOUNTER — TELEPHONE (OUTPATIENT)
Dept: SURGERY | Facility: CLINIC | Age: 65
End: 2025-08-22
Payer: MEDICARE

## 2025-08-27 ENCOUNTER — TELEPHONE (OUTPATIENT)
Dept: PREADMISSION TESTING | Facility: HOSPITAL | Age: 65
End: 2025-08-27
Payer: MEDICARE

## 2025-08-28 ENCOUNTER — ANESTHESIA EVENT (OUTPATIENT)
Dept: OPERATING ROOM | Facility: HOSPITAL | Age: 65
End: 2025-08-28
Payer: MEDICARE

## 2025-08-28 RX ORDER — DIPHENHYDRAMINE HYDROCHLORIDE 50 MG/ML
12.5 INJECTION, SOLUTION INTRAMUSCULAR; INTRAVENOUS ONCE AS NEEDED
Status: CANCELLED | OUTPATIENT
Start: 2025-08-28

## 2025-08-28 RX ORDER — OXYCODONE HYDROCHLORIDE 5 MG/1
5 TABLET ORAL EVERY 4 HOURS PRN
Refills: 0 | Status: CANCELLED | OUTPATIENT
Start: 2025-08-28

## 2025-08-28 RX ORDER — DROPERIDOL 2.5 MG/ML
0.62 INJECTION, SOLUTION INTRAMUSCULAR; INTRAVENOUS ONCE AS NEEDED
Status: CANCELLED | OUTPATIENT
Start: 2025-08-28

## 2025-08-28 RX ORDER — ALBUTEROL SULFATE 0.83 MG/ML
2.5 SOLUTION RESPIRATORY (INHALATION) ONCE AS NEEDED
Status: CANCELLED | OUTPATIENT
Start: 2025-08-28

## 2025-08-28 RX ORDER — ONDANSETRON HYDROCHLORIDE 2 MG/ML
4 INJECTION, SOLUTION INTRAVENOUS ONCE AS NEEDED
Status: CANCELLED | OUTPATIENT
Start: 2025-08-28

## 2025-08-28 RX ORDER — MEPERIDINE HYDROCHLORIDE 25 MG/ML
12.5 INJECTION INTRAMUSCULAR; INTRAVENOUS; SUBCUTANEOUS EVERY 10 MIN PRN
Status: CANCELLED | OUTPATIENT
Start: 2025-08-28

## 2025-08-28 RX ORDER — SODIUM CHLORIDE, SODIUM LACTATE, POTASSIUM CHLORIDE, CALCIUM CHLORIDE 600; 310; 30; 20 MG/100ML; MG/100ML; MG/100ML; MG/100ML
100 INJECTION, SOLUTION INTRAVENOUS CONTINUOUS
Status: CANCELLED | OUTPATIENT
Start: 2025-08-28 | End: 2025-08-29

## 2025-08-29 ENCOUNTER — HOSPITAL ENCOUNTER (OUTPATIENT)
Dept: CARDIOLOGY | Facility: HOSPITAL | Age: 65
Discharge: HOME | End: 2025-08-29
Payer: MEDICARE

## 2025-08-29 ENCOUNTER — ANESTHESIA (OUTPATIENT)
Dept: OPERATING ROOM | Facility: HOSPITAL | Age: 65
End: 2025-08-29
Payer: MEDICARE

## 2025-08-29 ENCOUNTER — TELEPHONE (OUTPATIENT)
Dept: SURGERY | Facility: CLINIC | Age: 65
End: 2025-08-29

## 2025-08-29 ENCOUNTER — HOSPITAL ENCOUNTER (OUTPATIENT)
Dept: OPERATING ROOM | Facility: HOSPITAL | Age: 65
Setting detail: OUTPATIENT SURGERY
Discharge: HOME | End: 2025-08-29
Payer: MEDICARE

## 2025-08-29 ENCOUNTER — HOSPITAL ENCOUNTER (OUTPATIENT)
Dept: RADIOLOGY | Facility: HOSPITAL | Age: 65
Discharge: HOME | End: 2025-08-29
Payer: MEDICARE

## 2025-08-29 VITALS
SYSTOLIC BLOOD PRESSURE: 147 MMHG | BODY MASS INDEX: 21 KG/M2 | DIASTOLIC BLOOD PRESSURE: 73 MMHG | RESPIRATION RATE: 16 BRPM | WEIGHT: 133.82 LBS | OXYGEN SATURATION: 100 % | HEIGHT: 67 IN | TEMPERATURE: 97.7 F | HEART RATE: 70 BPM

## 2025-08-29 DIAGNOSIS — R10.12 LEFT UPPER QUADRANT ABDOMINAL PAIN: ICD-10-CM

## 2025-08-29 DIAGNOSIS — R10.812 LEFT UPPER QUADRANT ABDOMINAL TENDERNESS WITHOUT REBOUND TENDERNESS: ICD-10-CM

## 2025-08-29 DIAGNOSIS — Z01.818 PREOPERATIVE CLEARANCE: ICD-10-CM

## 2025-08-29 DIAGNOSIS — Z98.84 BARIATRIC SURGERY STATUS: Primary | ICD-10-CM

## 2025-08-29 DIAGNOSIS — K45.8 INTERNAL HERNIA: Primary | ICD-10-CM

## 2025-08-29 DIAGNOSIS — Z98.84 BARIATRIC SURGERY STATUS: ICD-10-CM

## 2025-08-29 LAB
ABO GROUP (TYPE) IN BLOOD: NORMAL
ALBUMIN SERPL BCP-MCNC: 4.2 G/DL (ref 3.4–5)
ALP SERPL-CCNC: 70 U/L (ref 33–136)
ALT SERPL W P-5'-P-CCNC: 41 U/L (ref 7–45)
ANION GAP SERPL CALC-SCNC: 10 MMOL/L (ref 10–20)
ANTIBODY SCREEN: NORMAL
APTT PPP: 34 SECONDS (ref 26–36)
AST SERPL W P-5'-P-CCNC: 30 U/L (ref 9–39)
ATRIAL RATE: 64 BPM
BILIRUB SERPL-MCNC: 0.6 MG/DL (ref 0–1.2)
BUN SERPL-MCNC: 9 MG/DL (ref 6–23)
CALCIUM SERPL-MCNC: 8.9 MG/DL (ref 8.6–10.3)
CHLORIDE SERPL-SCNC: 105 MMOL/L (ref 98–107)
CO2 SERPL-SCNC: 30 MMOL/L (ref 21–32)
CREAT SERPL-MCNC: 0.65 MG/DL (ref 0.5–1.05)
EGFRCR SERPLBLD CKD-EPI 2021: >90 ML/MIN/1.73M*2
GLUCOSE SERPL-MCNC: 82 MG/DL (ref 74–99)
INR PPP: 1 (ref 0.9–1.1)
P AXIS: 62 DEGREES
P OFFSET: 206 MS
P ONSET: 147 MS
POTASSIUM SERPL-SCNC: 3.2 MMOL/L (ref 3.5–5.3)
PR INTERVAL: 138 MS
PROT SERPL-MCNC: 7.6 G/DL (ref 6.4–8.2)
PROTHROMBIN TIME: 11.3 SECONDS (ref 9.8–12.4)
Q ONSET: 216 MS
QRS COUNT: 10 BEATS
QRS DURATION: 94 MS
QT INTERVAL: 402 MS
QTC CALCULATION(BAZETT): 414 MS
QTC FREDERICIA: 410 MS
R AXIS: 59 DEGREES
RH FACTOR (ANTIGEN D): NORMAL
SODIUM SERPL-SCNC: 142 MMOL/L (ref 136–145)
T AXIS: 36 DEGREES
T OFFSET: 417 MS
VENTRICULAR RATE: 64 BPM

## 2025-08-29 PROCEDURE — 3700000001 HC GENERAL ANESTHESIA TIME - INITIAL BASE CHARGE: Performed by: ANESTHESIOLOGY

## 2025-08-29 PROCEDURE — 80053 COMPREHEN METABOLIC PANEL: CPT | Performed by: STUDENT IN AN ORGANIZED HEALTH CARE EDUCATION/TRAINING PROGRAM

## 2025-08-29 PROCEDURE — 71045 X-RAY EXAM CHEST 1 VIEW: CPT

## 2025-08-29 PROCEDURE — 3700000002 HC GENERAL ANESTHESIA TIME - EACH INCREMENTAL 1 MINUTE: Performed by: ANESTHESIOLOGY

## 2025-08-29 PROCEDURE — 2500000004 HC RX 250 GENERAL PHARMACY W/ HCPCS (ALT 636 FOR OP/ED): Performed by: ANESTHESIOLOGY

## 2025-08-29 PROCEDURE — 2500000004 HC RX 250 GENERAL PHARMACY W/ HCPCS (ALT 636 FOR OP/ED): Performed by: NURSE ANESTHETIST, CERTIFIED REGISTERED

## 2025-08-29 PROCEDURE — 36415 COLL VENOUS BLD VENIPUNCTURE: CPT | Performed by: STUDENT IN AN ORGANIZED HEALTH CARE EDUCATION/TRAINING PROGRAM

## 2025-08-29 PROCEDURE — 86901 BLOOD TYPING SEROLOGIC RH(D): CPT | Performed by: STUDENT IN AN ORGANIZED HEALTH CARE EDUCATION/TRAINING PROGRAM

## 2025-08-29 PROCEDURE — 99215 OFFICE O/P EST HI 40 MIN: CPT | Performed by: STUDENT IN AN ORGANIZED HEALTH CARE EDUCATION/TRAINING PROGRAM

## 2025-08-29 PROCEDURE — 3600000002 HC OR TIME - INITIAL BASE CHARGE - PROCEDURE LEVEL TWO: Performed by: ANESTHESIOLOGY

## 2025-08-29 PROCEDURE — 7100000010 HC PHASE TWO TIME - EACH INCREMENTAL 1 MINUTE: Performed by: ANESTHESIOLOGY

## 2025-08-29 PROCEDURE — 85610 PROTHROMBIN TIME: CPT | Performed by: STUDENT IN AN ORGANIZED HEALTH CARE EDUCATION/TRAINING PROGRAM

## 2025-08-29 PROCEDURE — 43235 EGD DIAGNOSTIC BRUSH WASH: CPT | Performed by: SURGERY

## 2025-08-29 PROCEDURE — 7100000009 HC PHASE TWO TIME - INITIAL BASE CHARGE: Performed by: ANESTHESIOLOGY

## 2025-08-29 PROCEDURE — 93005 ELECTROCARDIOGRAM TRACING: CPT

## 2025-08-29 PROCEDURE — 3600000007 HC OR TIME - EACH INCREMENTAL 1 MINUTE - PROCEDURE LEVEL TWO: Performed by: ANESTHESIOLOGY

## 2025-08-29 RX ORDER — LIDOCAINE HCL/PF 100 MG/5ML
SYRINGE (ML) INTRAVENOUS AS NEEDED
Status: DISCONTINUED | OUTPATIENT
Start: 2025-08-29 | End: 2025-08-29

## 2025-08-29 RX ORDER — SODIUM CHLORIDE, SODIUM LACTATE, POTASSIUM CHLORIDE, CALCIUM CHLORIDE 600; 310; 30; 20 MG/100ML; MG/100ML; MG/100ML; MG/100ML
20 INJECTION, SOLUTION INTRAVENOUS CONTINUOUS
Status: DISCONTINUED | OUTPATIENT
Start: 2025-08-29 | End: 2025-08-30 | Stop reason: HOSPADM

## 2025-08-29 RX ORDER — MIDAZOLAM HYDROCHLORIDE 1 MG/ML
INJECTION, SOLUTION INTRAMUSCULAR; INTRAVENOUS AS NEEDED
Status: DISCONTINUED | OUTPATIENT
Start: 2025-08-29 | End: 2025-08-29

## 2025-08-29 RX ORDER — PROPOFOL 10 MG/ML
INJECTION, EMULSION INTRAVENOUS AS NEEDED
Status: DISCONTINUED | OUTPATIENT
Start: 2025-08-29 | End: 2025-08-29

## 2025-08-29 RX ADMIN — PROPOFOL 160 MCG/KG/MIN: 10 INJECTION, EMULSION INTRAVENOUS at 09:36

## 2025-08-29 RX ADMIN — LIDOCAINE HYDROCHLORIDE 40 MG: 20 INJECTION INTRAVENOUS at 09:35

## 2025-08-29 RX ADMIN — SODIUM CHLORIDE, SODIUM LACTATE, POTASSIUM CHLORIDE, AND CALCIUM CHLORIDE 20 ML/HR: .6; .31; .03; .02 INJECTION, SOLUTION INTRAVENOUS at 08:59

## 2025-08-29 RX ADMIN — PROPOFOL 50 MG: 10 INJECTION, EMULSION INTRAVENOUS at 09:35

## 2025-08-29 RX ADMIN — MIDAZOLAM 2 MG: 1 INJECTION INTRAMUSCULAR; INTRAVENOUS at 09:26

## 2025-08-29 SDOH — HEALTH STABILITY: MENTAL HEALTH: CURRENT SMOKER: 0

## 2025-08-29 ASSESSMENT — PAIN - FUNCTIONAL ASSESSMENT: PAIN_FUNCTIONAL_ASSESSMENT: 0-10

## 2025-08-29 ASSESSMENT — PAIN SCALES - GENERAL
PAINLEVEL_OUTOF10: 0 - NO PAIN
PAINLEVEL_OUTOF10: 0 - NO PAIN

## 2025-09-02 ENCOUNTER — TELEPHONE (OUTPATIENT)
Dept: SURGERY | Facility: CLINIC | Age: 65
End: 2025-09-02
Payer: MEDICARE

## 2025-09-03 ENCOUNTER — ANESTHESIA EVENT (OUTPATIENT)
Dept: OPERATING ROOM | Facility: HOSPITAL | Age: 65
End: 2025-09-03
Payer: MEDICARE

## 2025-09-04 ENCOUNTER — HOSPITAL ENCOUNTER (OUTPATIENT)
Facility: HOSPITAL | Age: 65
Setting detail: OUTPATIENT SURGERY
Discharge: HOME | End: 2025-09-04
Attending: SURGERY | Admitting: SURGERY
Payer: MEDICARE

## 2025-09-04 ENCOUNTER — PHARMACY VISIT (OUTPATIENT)
Dept: PHARMACY | Facility: CLINIC | Age: 65
End: 2025-09-04
Payer: COMMERCIAL

## 2025-09-04 ENCOUNTER — ANESTHESIA (OUTPATIENT)
Dept: OPERATING ROOM | Facility: HOSPITAL | Age: 65
End: 2025-09-04
Payer: MEDICARE

## 2025-09-04 VITALS
DIASTOLIC BLOOD PRESSURE: 75 MMHG | HEIGHT: 67 IN | BODY MASS INDEX: 20.87 KG/M2 | OXYGEN SATURATION: 100 % | RESPIRATION RATE: 14 BRPM | SYSTOLIC BLOOD PRESSURE: 132 MMHG | HEART RATE: 69 BPM | TEMPERATURE: 98.4 F | WEIGHT: 132.94 LBS

## 2025-09-04 DIAGNOSIS — R10.12 LEFT UPPER QUADRANT ABDOMINAL PAIN: ICD-10-CM

## 2025-09-04 DIAGNOSIS — Z98.84 BARIATRIC SURGERY STATUS: Primary | ICD-10-CM

## 2025-09-04 DIAGNOSIS — K45.8 INTERNAL HERNIA: ICD-10-CM

## 2025-09-04 LAB
APPEARANCE UR: CLEAR
BACTERIA #/AREA URNS HPF: ABNORMAL /HPF
BACTERIA UR CULT: ABNORMAL
BACTERIA UR CULT: ABNORMAL
BASOPHILS # BLD AUTO: 29 CELLS/UL (ref 0–200)
BASOPHILS NFR BLD AUTO: 0.6 %
BILIRUB UR QL STRIP: NEGATIVE
COLOR UR: YELLOW
EOSINOPHIL # BLD AUTO: 49 CELLS/UL (ref 15–500)
EOSINOPHIL NFR BLD AUTO: 1 %
ERYTHROCYTE [DISTWIDTH] IN BLOOD BY AUTOMATED COUNT: 12.3 % (ref 11–15)
GLUCOSE UR QL STRIP: NEGATIVE
HCT VFR BLD AUTO: 36.3 % (ref 35–45)
HGB BLD-MCNC: 11.9 G/DL (ref 11.7–15.5)
HGB UR QL STRIP: NEGATIVE
HYALINE CASTS #/AREA URNS LPF: ABNORMAL /LPF
KETONES UR QL STRIP: NEGATIVE
LEUKOCYTE ESTERASE UR QL STRIP: ABNORMAL
LYMPHOCYTES # BLD AUTO: 2019 CELLS/UL (ref 850–3900)
LYMPHOCYTES NFR BLD AUTO: 41.2 %
MCH RBC QN AUTO: 33.2 PG (ref 27–33)
MCHC RBC AUTO-ENTMCNC: 32.8 G/DL (ref 32–36)
MCV RBC AUTO: 101.4 FL (ref 80–100)
MONOCYTES # BLD AUTO: 294 CELLS/UL (ref 200–950)
MONOCYTES NFR BLD AUTO: 6 %
NEUTROPHILS # BLD AUTO: 2509 CELLS/UL (ref 1500–7800)
NEUTROPHILS NFR BLD AUTO: 51.2 %
NITRITE UR QL STRIP: NEGATIVE
PH UR STRIP: 6 [PH] (ref 5–8)
PLATELET # BLD AUTO: 183 THOUSAND/UL (ref 140–400)
PMV BLD REES-ECKER: 10.6 FL (ref 7.5–12.5)
PROT UR QL STRIP: NEGATIVE
RBC # BLD AUTO: 3.58 MILLION/UL (ref 3.8–5.1)
RBC #/AREA URNS HPF: ABNORMAL /HPF
SERVICE CMNT-IMP: ABNORMAL
SP GR UR STRIP: 1.01 (ref 1–1.03)
SQUAMOUS #/AREA URNS HPF: ABNORMAL /HPF
WBC # BLD AUTO: 4.9 THOUSAND/UL (ref 3.8–10.8)
WBC #/AREA URNS HPF: ABNORMAL /HPF

## 2025-09-04 PROCEDURE — RXMED WILLOW AMBULATORY MEDICATION CHARGE

## 2025-09-04 PROCEDURE — 7100000010 HC PHASE TWO TIME - EACH INCREMENTAL 1 MINUTE: Performed by: SURGERY

## 2025-09-04 PROCEDURE — 3700000001 HC GENERAL ANESTHESIA TIME - INITIAL BASE CHARGE: Performed by: SURGERY

## 2025-09-04 PROCEDURE — 7100000009 HC PHASE TWO TIME - INITIAL BASE CHARGE: Performed by: SURGERY

## 2025-09-04 PROCEDURE — 2720000007 HC OR 272 NO HCPCS: Performed by: SURGERY

## 2025-09-04 PROCEDURE — 2500000004 HC RX 250 GENERAL PHARMACY W/ HCPCS (ALT 636 FOR OP/ED): Performed by: STUDENT IN AN ORGANIZED HEALTH CARE EDUCATION/TRAINING PROGRAM

## 2025-09-04 PROCEDURE — 3600000003 HC OR TIME - INITIAL BASE CHARGE - PROCEDURE LEVEL THREE: Performed by: SURGERY

## 2025-09-04 PROCEDURE — 3700000002 HC GENERAL ANESTHESIA TIME - EACH INCREMENTAL 1 MINUTE: Performed by: SURGERY

## 2025-09-04 PROCEDURE — 2500000005 HC RX 250 GENERAL PHARMACY W/O HCPCS: Performed by: SURGERY

## 2025-09-04 PROCEDURE — 2500000001 HC RX 250 WO HCPCS SELF ADMINISTERED DRUGS (ALT 637 FOR MEDICARE OP): Performed by: STUDENT IN AN ORGANIZED HEALTH CARE EDUCATION/TRAINING PROGRAM

## 2025-09-04 PROCEDURE — 2500000005 HC RX 250 GENERAL PHARMACY W/O HCPCS: Performed by: NURSE ANESTHETIST, CERTIFIED REGISTERED

## 2025-09-04 PROCEDURE — 2500000004 HC RX 250 GENERAL PHARMACY W/ HCPCS (ALT 636 FOR OP/ED): Performed by: NURSE ANESTHETIST, CERTIFIED REGISTERED

## 2025-09-04 PROCEDURE — 2500000004 HC RX 250 GENERAL PHARMACY W/ HCPCS (ALT 636 FOR OP/ED): Performed by: ANESTHESIOLOGY

## 2025-09-04 PROCEDURE — 3600000008 HC OR TIME - EACH INCREMENTAL 1 MINUTE - PROCEDURE LEVEL THREE: Performed by: SURGERY

## 2025-09-04 PROCEDURE — 7100000001 HC RECOVERY ROOM TIME - INITIAL BASE CHARGE: Performed by: SURGERY

## 2025-09-04 PROCEDURE — 7100000002 HC RECOVERY ROOM TIME - EACH INCREMENTAL 1 MINUTE: Performed by: SURGERY

## 2025-09-04 PROCEDURE — 2500000004 HC RX 250 GENERAL PHARMACY W/ HCPCS (ALT 636 FOR OP/ED): Performed by: SURGERY

## 2025-09-04 RX ORDER — ROCURONIUM BROMIDE 10 MG/ML
INJECTION, SOLUTION INTRAVENOUS AS NEEDED
Status: DISCONTINUED | OUTPATIENT
Start: 2025-09-04 | End: 2025-09-04

## 2025-09-04 RX ORDER — GABAPENTIN 300 MG/1
600 CAPSULE ORAL ONCE
Status: COMPLETED | OUTPATIENT
Start: 2025-09-04 | End: 2025-09-04

## 2025-09-04 RX ORDER — METRONIDAZOLE 500 MG/1
500 TABLET ORAL 2 TIMES DAILY
Qty: 28 TABLET | Refills: 0 | Status: SHIPPED | OUTPATIENT
Start: 2025-09-04 | End: 2025-09-18

## 2025-09-04 RX ORDER — MIDAZOLAM HYDROCHLORIDE 1 MG/ML
INJECTION, SOLUTION INTRAMUSCULAR; INTRAVENOUS AS NEEDED
Status: DISCONTINUED | OUTPATIENT
Start: 2025-09-04 | End: 2025-09-04

## 2025-09-04 RX ORDER — LIDOCAINE HYDROCHLORIDE 40 MG/ML
SOLUTION TOPICAL AS NEEDED
Status: DISCONTINUED | OUTPATIENT
Start: 2025-09-04 | End: 2025-09-04

## 2025-09-04 RX ORDER — ONDANSETRON HYDROCHLORIDE 2 MG/ML
INJECTION, SOLUTION INTRAVENOUS AS NEEDED
Status: DISCONTINUED | OUTPATIENT
Start: 2025-09-04 | End: 2025-09-04

## 2025-09-04 RX ORDER — ALBUTEROL SULFATE 0.83 MG/ML
2.5 SOLUTION RESPIRATORY (INHALATION) ONCE AS NEEDED
Status: DISCONTINUED | OUTPATIENT
Start: 2025-09-04 | End: 2025-09-04 | Stop reason: HOSPADM

## 2025-09-04 RX ORDER — LIDOCAINE HYDROCHLORIDE 10 MG/ML
INJECTION, SOLUTION EPIDURAL; INFILTRATION; INTRACAUDAL; PERINEURAL AS NEEDED
Status: DISCONTINUED | OUTPATIENT
Start: 2025-09-04 | End: 2025-09-04

## 2025-09-04 RX ORDER — ONDANSETRON HYDROCHLORIDE 2 MG/ML
4 INJECTION, SOLUTION INTRAVENOUS ONCE AS NEEDED
Status: DISCONTINUED | OUTPATIENT
Start: 2025-09-04 | End: 2025-09-04 | Stop reason: HOSPADM

## 2025-09-04 RX ORDER — FENTANYL CITRATE 50 UG/ML
INJECTION, SOLUTION INTRAMUSCULAR; INTRAVENOUS AS NEEDED
Status: DISCONTINUED | OUTPATIENT
Start: 2025-09-04 | End: 2025-09-04

## 2025-09-04 RX ORDER — SODIUM CHLORIDE, SODIUM LACTATE, POTASSIUM CHLORIDE, CALCIUM CHLORIDE 600; 310; 30; 20 MG/100ML; MG/100ML; MG/100ML; MG/100ML
20 INJECTION, SOLUTION INTRAVENOUS CONTINUOUS
Status: DISCONTINUED | OUTPATIENT
Start: 2025-09-04 | End: 2025-09-04 | Stop reason: HOSPADM

## 2025-09-04 RX ORDER — CEFAZOLIN SODIUM 2 G/50ML
2 SOLUTION INTRAVENOUS ONCE
Status: DISCONTINUED | OUTPATIENT
Start: 2025-09-04 | End: 2025-09-04 | Stop reason: HOSPADM

## 2025-09-04 RX ORDER — OXYCODONE HYDROCHLORIDE 5 MG/1
5 TABLET ORAL EVERY 4 HOURS PRN
Status: DISCONTINUED | OUTPATIENT
Start: 2025-09-04 | End: 2025-09-04 | Stop reason: HOSPADM

## 2025-09-04 RX ORDER — DROPERIDOL 2.5 MG/ML
0.62 INJECTION, SOLUTION INTRAMUSCULAR; INTRAVENOUS ONCE AS NEEDED
Status: DISCONTINUED | OUTPATIENT
Start: 2025-09-04 | End: 2025-09-04 | Stop reason: HOSPADM

## 2025-09-04 RX ORDER — SCOPOLAMINE 1 MG/3D
1 PATCH, EXTENDED RELEASE TRANSDERMAL ONCE
Status: DISCONTINUED | OUTPATIENT
Start: 2025-09-04 | End: 2025-09-04 | Stop reason: HOSPADM

## 2025-09-04 RX ORDER — KETOROLAC TROMETHAMINE 30 MG/ML
INJECTION, SOLUTION INTRAMUSCULAR; INTRAVENOUS AS NEEDED
Status: DISCONTINUED | OUTPATIENT
Start: 2025-09-04 | End: 2025-09-04

## 2025-09-04 RX ORDER — MEPERIDINE HYDROCHLORIDE 25 MG/ML
12.5 INJECTION INTRAMUSCULAR; INTRAVENOUS; SUBCUTANEOUS EVERY 10 MIN PRN
Status: DISCONTINUED | OUTPATIENT
Start: 2025-09-04 | End: 2025-09-04 | Stop reason: HOSPADM

## 2025-09-04 RX ORDER — DIPHENHYDRAMINE HYDROCHLORIDE 50 MG/ML
12.5 INJECTION, SOLUTION INTRAMUSCULAR; INTRAVENOUS ONCE AS NEEDED
Status: DISCONTINUED | OUTPATIENT
Start: 2025-09-04 | End: 2025-09-04 | Stop reason: HOSPADM

## 2025-09-04 RX ORDER — PROPOFOL 10 MG/ML
INJECTION, EMULSION INTRAVENOUS AS NEEDED
Status: DISCONTINUED | OUTPATIENT
Start: 2025-09-04 | End: 2025-09-04

## 2025-09-04 RX ORDER — CEFAZOLIN 1 G/1
INJECTION, POWDER, FOR SOLUTION INTRAVENOUS AS NEEDED
Status: DISCONTINUED | OUTPATIENT
Start: 2025-09-04 | End: 2025-09-04

## 2025-09-04 RX ORDER — SODIUM CHLORIDE, SODIUM LACTATE, POTASSIUM CHLORIDE, CALCIUM CHLORIDE 600; 310; 30; 20 MG/100ML; MG/100ML; MG/100ML; MG/100ML
100 INJECTION, SOLUTION INTRAVENOUS CONTINUOUS
Status: DISCONTINUED | OUTPATIENT
Start: 2025-09-04 | End: 2025-09-04 | Stop reason: HOSPADM

## 2025-09-04 RX ORDER — ONDANSETRON 4 MG/1
4 TABLET, ORALLY DISINTEGRATING ORAL EVERY 8 HOURS PRN
Qty: 20 TABLET | Refills: 0 | Status: SHIPPED | OUTPATIENT
Start: 2025-09-04 | End: 2025-09-18

## 2025-09-04 RX ORDER — OXYCODONE HCL 5 MG/5 ML
5 SOLUTION, ORAL ORAL EVERY 6 HOURS PRN
Qty: 80 ML | Refills: 0 | Status: SHIPPED | OUTPATIENT
Start: 2025-09-04 | End: 2025-09-09

## 2025-09-04 RX ADMIN — SODIUM CHLORIDE, SODIUM LACTATE, POTASSIUM CHLORIDE, AND CALCIUM CHLORIDE 20 ML/HR: .6; .31; .03; .02 INJECTION, SOLUTION INTRAVENOUS at 09:47

## 2025-09-04 RX ADMIN — LIDOCAINE HYDROCHLORIDE 50 MG: 10 INJECTION, SOLUTION EPIDURAL; INFILTRATION; INTRACAUDAL; PERINEURAL at 10:31

## 2025-09-04 RX ADMIN — ROCURONIUM BROMIDE 50 MG: 10 INJECTION INTRAVENOUS at 10:31

## 2025-09-04 RX ADMIN — LIDOCAINE HYDROCHLORIDE 4 ML: 40 SOLUTION TOPICAL at 10:36

## 2025-09-04 RX ADMIN — PROPOFOL 150 MG: 10 INJECTION, EMULSION INTRAVENOUS at 10:31

## 2025-09-04 RX ADMIN — DEXAMETHASONE SODIUM PHOSPHATE 4 MG: 4 INJECTION, SOLUTION INTRAMUSCULAR; INTRAVENOUS at 10:39

## 2025-09-04 RX ADMIN — ONDANSETRON 4 MG: 2 INJECTION, SOLUTION INTRAMUSCULAR; INTRAVENOUS at 11:15

## 2025-09-04 RX ADMIN — MIDAZOLAM 1 MG: 1 INJECTION INTRAMUSCULAR; INTRAVENOUS at 10:49

## 2025-09-04 RX ADMIN — FENTANYL CITRATE 50 MCG: 50 INJECTION, SOLUTION INTRAMUSCULAR; INTRAVENOUS at 10:31

## 2025-09-04 RX ADMIN — KETOROLAC TROMETHAMINE 15 MG: 30 INJECTION, SOLUTION INTRAMUSCULAR at 11:15

## 2025-09-04 RX ADMIN — GABAPENTIN 300 MG: 300 CAPSULE ORAL at 09:46

## 2025-09-04 RX ADMIN — ROCURONIUM BROMIDE 5 MG: 10 INJECTION INTRAVENOUS at 11:14

## 2025-09-04 RX ADMIN — SUGAMMADEX 200 MG: 100 INJECTION, SOLUTION INTRAVENOUS at 11:28

## 2025-09-04 RX ADMIN — CEFAZOLIN 2 G: 1 INJECTION, POWDER, FOR SOLUTION INTRAMUSCULAR; INTRAVENOUS at 10:39

## 2025-09-04 RX ADMIN — FENTANYL CITRATE 50 MCG: 50 INJECTION, SOLUTION INTRAMUSCULAR; INTRAVENOUS at 10:58

## 2025-09-04 RX ADMIN — SCOPOLAMINE 1 PATCH: 1.5 PATCH, EXTENDED RELEASE TRANSDERMAL at 09:46

## 2025-09-04 SDOH — HEALTH STABILITY: MENTAL HEALTH: CURRENT SMOKER: 0

## 2025-09-04 ASSESSMENT — PAIN SCALES - GENERAL
PAIN_LEVEL: 0
PAINLEVEL_OUTOF10: 0 - NO PAIN
PAINLEVEL_OUTOF10: 1
PAINLEVEL_OUTOF10: 0 - NO PAIN
PAINLEVEL_OUTOF10: 0 - NO PAIN

## 2025-09-04 ASSESSMENT — PAIN - FUNCTIONAL ASSESSMENT
PAIN_FUNCTIONAL_ASSESSMENT: 0-10
PAIN_FUNCTIONAL_ASSESSMENT: UNABLE TO SELF-REPORT

## 2025-09-04 ASSESSMENT — PAIN DESCRIPTION - DESCRIPTORS: DESCRIPTORS: ACHING

## 2025-09-22 ENCOUNTER — APPOINTMENT (OUTPATIENT)
Dept: SURGERY | Facility: CLINIC | Age: 65
End: 2025-09-22
Payer: MEDICARE

## 2025-10-16 ENCOUNTER — APPOINTMENT (OUTPATIENT)
Dept: SURGERY | Facility: CLINIC | Age: 65
End: 2025-10-16
Payer: MEDICARE

## 2025-12-12 ENCOUNTER — APPOINTMENT (OUTPATIENT)
Dept: PRIMARY CARE | Facility: CLINIC | Age: 65
End: 2025-12-12
Payer: MEDICARE

## (undated) DEVICE — CAUTERY, PENCIL, PUSH BUTTON, SMOKE EVAC, 70MM

## (undated) DEVICE — SKIN CLOSURE SYS, PREMIERPRO EXOFIN, 1-4CM X 22CM, 1.75G TUBE

## (undated) DEVICE — ASSEMBLY, STRYKER FLOW 2, SUCTION IRRIGATOR, WITH TIP

## (undated) DEVICE — Device

## (undated) DEVICE — CLEAN KIT, ANTIFOG SCOPE, SEE SHARP 150MM

## (undated) DEVICE — ACCESS SYS, KII SHIELDED BLADED, Z-THREAD, 12X100CM

## (undated) DEVICE — TUBE SET, PNEUMOCLEAR, SMOKE EVACU, HIGH-FLOW

## (undated) DEVICE — CABLE, ELECTROSURGICAL, MONOPOLAR, LAPAROSCOPIC, 10 FT, LF

## (undated) DEVICE — TRAY, FOLEY, ADVANCE, 16FR, SILICONE, W/STATLOCK

## (undated) DEVICE — DRAPE, SHEET, FAN FOLDED, HALF, 44 X 58 IN, DISPOSABLE, LF, STERILE

## (undated) DEVICE — SUTURE, SILK, 2-0, 30 IN, SH, BLACK

## (undated) DEVICE — PLEDGET, TEFLON, CARDIAC, 5/16 X 5/16 IN, 7.9X7.9MM

## (undated) DEVICE — SUTURE, VICRYL, 4-0, 18 IN, PS2, UNDYED

## (undated) DEVICE — GOWN, SURGICAL, IMPLT, BACK, DISPOSABLE, XLARGE, STERILE

## (undated) DEVICE — SUTURE, SILK, 0, 30 IN, BR, BLACK

## (undated) DEVICE — SUTURE, VICRYL, 0, 27 IN, UR-6, VIOLET

## (undated) DEVICE — DRAPE PACK, LAPAROSCOPIC CHOLECYSTECTOMY, CUSTOM, GEAUGA

## (undated) DEVICE — SUTURE, CHROMIC, 0, 12 IN, LIGATING LOOP

## (undated) DEVICE — CARE KIT, LAPAROSCOPIC, ADVANCED

## (undated) DEVICE — SCISSOR, MINI ENDO CUT, TIPS, DISP

## (undated) DEVICE — ENDO, PORT 5/12 VISIPORT W/FIXIATION CANNULA 176674PF

## (undated) DEVICE — ADHESIVE, SKIN, DERMABOND ADVANCED, 15CM, PEN-STYLE

## (undated) DEVICE — LIGASURE, L-HOOK 37CM SEALER/DIVIDER LAP, MARYLAND

## (undated) DEVICE — ENDO, CLIP, 5MM, M/L

## (undated) DEVICE — DRAPE, INSTRUMENT, W/POUCH, STERI DRAPE, 9 5/8 X 18 LONG

## (undated) DEVICE — DRESSING, ADHESIVE, ISLAND, TELFA, 2 X 3.75 IN, LF

## (undated) DEVICE — SUTURE, VICRYL PLUS, 0, 54IN, VIOLET, BRAIDED

## (undated) DEVICE — SUTURE, VICRYL, 3-0, 27 IN, SH-1, VIOLET

## (undated) DEVICE — APPLICATOR, CHLORAPREP, W/ORANGE TINT, 26ML

## (undated) DEVICE — SOLUTION, INJECTION, USP, SODIUM CHLORIDE 0.9%, .9, NACL, 1000 ML, BAG